# Patient Record
Sex: MALE | Race: WHITE | NOT HISPANIC OR LATINO | URBAN - METROPOLITAN AREA
[De-identification: names, ages, dates, MRNs, and addresses within clinical notes are randomized per-mention and may not be internally consistent; named-entity substitution may affect disease eponyms.]

---

## 2017-10-19 ENCOUNTER — EMERGENCY (EMERGENCY)
Facility: HOSPITAL | Age: 37
LOS: 1 days | Discharge: HOME | End: 2017-10-19
Attending: EMERGENCY MEDICINE | Admitting: EMERGENCY MEDICINE
Payer: MEDICAID

## 2017-10-19 VITALS
TEMPERATURE: 98 F | RESPIRATION RATE: 16 BRPM | OXYGEN SATURATION: 100 % | SYSTOLIC BLOOD PRESSURE: 113 MMHG | DIASTOLIC BLOOD PRESSURE: 70 MMHG | HEART RATE: 63 BPM

## 2017-10-19 LAB
ALBUMIN SERPL ELPH-MCNC: 4.8 G/DL — SIGNIFICANT CHANGE UP (ref 3.3–5)
ALP SERPL-CCNC: 55 U/L — SIGNIFICANT CHANGE UP (ref 40–120)
ALT FLD-CCNC: 31 U/L — SIGNIFICANT CHANGE UP (ref 4–41)
AST SERPL-CCNC: 20 U/L — SIGNIFICANT CHANGE UP (ref 4–40)
BASOPHILS # BLD AUTO: 0.02 K/UL — SIGNIFICANT CHANGE UP (ref 0–0.2)
BASOPHILS NFR BLD AUTO: 0.2 % — SIGNIFICANT CHANGE UP (ref 0–2)
BILIRUB SERPL-MCNC: 0.4 MG/DL — SIGNIFICANT CHANGE UP (ref 0.2–1.2)
BUN SERPL-MCNC: 18 MG/DL — SIGNIFICANT CHANGE UP (ref 7–23)
CALCIUM SERPL-MCNC: 9.4 MG/DL — SIGNIFICANT CHANGE UP (ref 8.4–10.5)
CHLORIDE SERPL-SCNC: 101 MMOL/L — SIGNIFICANT CHANGE UP (ref 98–107)
CO2 SERPL-SCNC: 23 MMOL/L — SIGNIFICANT CHANGE UP (ref 22–31)
CREAT SERPL-MCNC: 1.06 MG/DL — SIGNIFICANT CHANGE UP (ref 0.5–1.3)
EOSINOPHIL # BLD AUTO: 0.11 K/UL — SIGNIFICANT CHANGE UP (ref 0–0.5)
EOSINOPHIL NFR BLD AUTO: 0.9 % — SIGNIFICANT CHANGE UP (ref 0–6)
ERYTHROCYTE [SEDIMENTATION RATE] IN BLOOD: 2 MM/HR — SIGNIFICANT CHANGE UP (ref 1–15)
GLUCOSE SERPL-MCNC: 91 MG/DL — SIGNIFICANT CHANGE UP (ref 70–99)
HCT VFR BLD CALC: 46.2 % — SIGNIFICANT CHANGE UP (ref 39–50)
HGB BLD-MCNC: 15.6 G/DL — SIGNIFICANT CHANGE UP (ref 13–17)
IMM GRANULOCYTES # BLD AUTO: 0.06 # — SIGNIFICANT CHANGE UP
IMM GRANULOCYTES NFR BLD AUTO: 0.5 % — SIGNIFICANT CHANGE UP (ref 0–1.5)
LYMPHOCYTES # BLD AUTO: 2.95 K/UL — SIGNIFICANT CHANGE UP (ref 1–3.3)
LYMPHOCYTES # BLD AUTO: 24.3 % — SIGNIFICANT CHANGE UP (ref 13–44)
MCHC RBC-ENTMCNC: 31 PG — SIGNIFICANT CHANGE UP (ref 27–34)
MCHC RBC-ENTMCNC: 33.8 % — SIGNIFICANT CHANGE UP (ref 32–36)
MCV RBC AUTO: 91.7 FL — SIGNIFICANT CHANGE UP (ref 80–100)
MONOCYTES # BLD AUTO: 0.8 K/UL — SIGNIFICANT CHANGE UP (ref 0–0.9)
MONOCYTES NFR BLD AUTO: 6.6 % — SIGNIFICANT CHANGE UP (ref 2–14)
NEUTROPHILS # BLD AUTO: 8.18 K/UL — HIGH (ref 1.8–7.4)
NEUTROPHILS NFR BLD AUTO: 67.5 % — SIGNIFICANT CHANGE UP (ref 43–77)
NRBC # FLD: 0 — SIGNIFICANT CHANGE UP
PLATELET # BLD AUTO: 133 K/UL — LOW (ref 150–400)
PMV BLD: 13.5 FL — HIGH (ref 7–13)
POTASSIUM SERPL-MCNC: 4.3 MMOL/L — SIGNIFICANT CHANGE UP (ref 3.5–5.3)
POTASSIUM SERPL-SCNC: 4.3 MMOL/L — SIGNIFICANT CHANGE UP (ref 3.5–5.3)
PROT SERPL-MCNC: 7.8 G/DL — SIGNIFICANT CHANGE UP (ref 6–8.3)
RBC # BLD: 5.04 M/UL — SIGNIFICANT CHANGE UP (ref 4.2–5.8)
RBC # FLD: 12.2 % — SIGNIFICANT CHANGE UP (ref 10.3–14.5)
SODIUM SERPL-SCNC: 139 MMOL/L — SIGNIFICANT CHANGE UP (ref 135–145)
WBC # BLD: 12.12 K/UL — HIGH (ref 3.8–10.5)
WBC # FLD AUTO: 12.12 K/UL — HIGH (ref 3.8–10.5)

## 2017-10-19 PROCEDURE — 73701 CT LOWER EXTREMITY W/DYE: CPT | Mod: 26,RT

## 2017-10-19 PROCEDURE — 99285 EMERGENCY DEPT VISIT HI MDM: CPT

## 2017-10-19 RX ORDER — TRAMADOL HYDROCHLORIDE 50 MG/1
1 TABLET ORAL
Qty: 12 | Refills: 0
Start: 2017-10-19 | End: 2017-10-22

## 2017-10-19 RX ORDER — OXYCODONE AND ACETAMINOPHEN 5; 325 MG/1; MG/1
1 TABLET ORAL ONCE
Qty: 0 | Refills: 0 | Status: DISCONTINUED | OUTPATIENT
Start: 2017-10-19 | End: 2017-10-19

## 2017-10-19 RX ADMIN — OXYCODONE AND ACETAMINOPHEN 1 TABLET(S): 5; 325 TABLET ORAL at 15:08

## 2017-10-19 RX ADMIN — Medication 300 MILLIGRAM(S): at 18:24

## 2017-10-19 RX ADMIN — OXYCODONE AND ACETAMINOPHEN 1 TABLET(S): 5; 325 TABLET ORAL at 15:55

## 2017-10-19 NOTE — ED PROVIDER NOTE - PROGRESS NOTE DETAILS
BEATRIZ Conn - pt states his pain was not controlled at home with ibuprofen only. percocet helped here. will send prescription. marked area of cellulitis, mild erythema +warmth. no swelling. no fever, chills, significant elevated wbc. pt ambulating well. amenable for dc, given return precautions for cellulitis. BEATRIZ Conn - pt states his pain was not controlled at home with ibuprofen only. percocet helped here. pt states he is on probation for unknown cause and may have to complete drug test and needs validation, as per . Amanuel Mackey will send prescription for Tramadol. marked area of cellulitis, mild erythema +warmth. no swelling. no fever, chills, no significant elevated wbc. pt ambulating well. amenable for dc, given return precautions for cellulitis.

## 2017-10-19 NOTE — ED PROVIDER NOTE - PLAN OF CARE
You received antibiotics Clindamycin and Percocet for pain today on 10/19/17. Rest, advance activity as tolerated. Keep foot elevated. Take Clindamycin 300mg every 6 hours for 5 days for infection. Take Ibuprofen 600mg (three over the counter pills) every 8 hours as needed for moderate pain. Take Percocet every 6 hours as needed for severe pain - DO NOT DRIVE, DRINK ALCOHOL OR HANDLE HEAVY MACHINERY as this may cause drowsiness. Follow up with Podiatry this week, referral list given. Return to ER for any new or worsening symptoms, fever, chills, increased redness, swelling, streaking, weakness, numbness, or any other concerns. You received antibiotics Clindamycin and Percocet for pain today on 10/19/17. Rest, advance activity as tolerated. Keep foot elevated. Take Clindamycin 300mg every 6 hours for 5 days for infection. Take Ibuprofen 600mg (three over the counter pills) every 8 hours as needed for moderate pain. Take Tramadol every 6 hours as needed for severe pain - DO NOT DRIVE, DRINK ALCOHOL OR HANDLE HEAVY MACHINERY as this may cause drowsiness. Follow up with Podiatry this week, referral list given. Return to ER for any new or worsening symptoms, fever, chills, increased redness, swelling, streaking, weakness, numbness, or any other concerns.

## 2017-10-19 NOTE — ED PROVIDER NOTE - OBJECTIVE STATEMENT
38 y/o M w/ no significant PMHx, presents to the ED c/o right foot pain x2 days. Pt was seen at urgent care yesterday, negative XR and Rx Ibuprofen w/ no relief. Denies trauma, fall, numbness/tingling or any other complaints. NKDA.

## 2017-10-19 NOTE — ED PROVIDER NOTE - MEDICAL DECISION MAKING DETAILS
36 y/o M w/ right foot pain x1 day, negative XR as outpatient w/ evidence of inflammation and signs of infection. Will obtain XR of foot, blood test and pain control.

## 2017-10-19 NOTE — ED PROVIDER NOTE - CARE PLAN
Principal Discharge DX:	Cellulitis  Instructions for follow-up, activity and diet:	You received antibiotics Clindamycin and Percocet for pain today on 10/19/17. Rest, advance activity as tolerated. Keep foot elevated. Take Clindamycin 300mg every 6 hours for 5 days for infection. Take Ibuprofen 600mg (three over the counter pills) every 8 hours as needed for moderate pain. Take Percocet every 6 hours as needed for severe pain - DO NOT DRIVE, DRINK ALCOHOL OR HANDLE HEAVY MACHINERY as this may cause drowsiness. Follow up with Podiatry this week, referral list given. Return to ER for any new or worsening symptoms, fever, chills, increased redness, swelling, streaking, weakness, numbness, or any other concerns. Principal Discharge DX:	Cellulitis  Instructions for follow-up, activity and diet:	You received antibiotics Clindamycin and Percocet for pain today on 10/19/17. Rest, advance activity as tolerated. Keep foot elevated. Take Clindamycin 300mg every 6 hours for 5 days for infection. Take Ibuprofen 600mg (three over the counter pills) every 8 hours as needed for moderate pain. Take Tramadol every 6 hours as needed for severe pain - DO NOT DRIVE, DRINK ALCOHOL OR HANDLE HEAVY MACHINERY as this may cause drowsiness. Follow up with Podiatry this week, referral list given. Return to ER for any new or worsening symptoms, fever, chills, increased redness, swelling, streaking, weakness, numbness, or any other concerns.

## 2017-10-19 NOTE — ED PROVIDER NOTE - LOWER EXTREMITY EXAM, RIGHT
approximately half of palm area of redness overlying the metatarsal of 4th and 5th toes w/ ttp, neurovascularly intact

## 2017-10-19 NOTE — ED ADULT TRIAGE NOTE - CHIEF COMPLAINT QUOTE
pt c/o non traumatic pain to right foot, states that he was seen in Urgent Care yesterday and had neg xray and was given Motrin, not feeling better.  Pt in NAD, ambulatory with limp

## 2017-10-21 ENCOUNTER — EMERGENCY (EMERGENCY)
Facility: HOSPITAL | Age: 37
LOS: 1 days | Discharge: HOME | End: 2017-10-21
Attending: EMERGENCY MEDICINE | Admitting: EMERGENCY MEDICINE
Payer: MEDICAID

## 2017-10-21 VITALS
HEART RATE: 66 BPM | SYSTOLIC BLOOD PRESSURE: 111 MMHG | RESPIRATION RATE: 18 BRPM | DIASTOLIC BLOOD PRESSURE: 72 MMHG | TEMPERATURE: 98 F | OXYGEN SATURATION: 100 %

## 2017-10-21 LAB
ALBUMIN SERPL ELPH-MCNC: 4.3 G/DL — SIGNIFICANT CHANGE UP (ref 3.3–5)
ALP SERPL-CCNC: 51 U/L — SIGNIFICANT CHANGE UP (ref 40–120)
ALT FLD-CCNC: 33 U/L — SIGNIFICANT CHANGE UP (ref 4–41)
AST SERPL-CCNC: 19 U/L — SIGNIFICANT CHANGE UP (ref 4–40)
BASOPHILS # BLD AUTO: 0.02 K/UL — SIGNIFICANT CHANGE UP (ref 0–0.2)
BASOPHILS NFR BLD AUTO: 0.2 % — SIGNIFICANT CHANGE UP (ref 0–2)
BILIRUB SERPL-MCNC: 0.2 MG/DL — SIGNIFICANT CHANGE UP (ref 0.2–1.2)
BUN SERPL-MCNC: 15 MG/DL — SIGNIFICANT CHANGE UP (ref 7–23)
CALCIUM SERPL-MCNC: 9 MG/DL — SIGNIFICANT CHANGE UP (ref 8.4–10.5)
CHLORIDE SERPL-SCNC: 106 MMOL/L — SIGNIFICANT CHANGE UP (ref 98–107)
CK MB BLD-MCNC: 1.45 NG/ML — SIGNIFICANT CHANGE UP (ref 1–6.6)
CK MB BLD-MCNC: SIGNIFICANT CHANGE UP (ref 0–2.5)
CK SERPL-CCNC: 78 U/L — SIGNIFICANT CHANGE UP (ref 30–200)
CO2 SERPL-SCNC: 22 MMOL/L — SIGNIFICANT CHANGE UP (ref 22–31)
CREAT SERPL-MCNC: 1.06 MG/DL — SIGNIFICANT CHANGE UP (ref 0.5–1.3)
EOSINOPHIL # BLD AUTO: 0.21 K/UL — SIGNIFICANT CHANGE UP (ref 0–0.5)
EOSINOPHIL NFR BLD AUTO: 2.2 % — SIGNIFICANT CHANGE UP (ref 0–6)
GLUCOSE SERPL-MCNC: 107 MG/DL — HIGH (ref 70–99)
HBA1C BLD-MCNC: 4.8 % — SIGNIFICANT CHANGE UP (ref 4–5.6)
HCT VFR BLD CALC: 43.8 % — SIGNIFICANT CHANGE UP (ref 39–50)
HGB BLD-MCNC: 14.7 G/DL — SIGNIFICANT CHANGE UP (ref 13–17)
IMM GRANULOCYTES # BLD AUTO: 0.03 # — SIGNIFICANT CHANGE UP
IMM GRANULOCYTES NFR BLD AUTO: 0.3 % — SIGNIFICANT CHANGE UP (ref 0–1.5)
LYMPHOCYTES # BLD AUTO: 3.44 K/UL — HIGH (ref 1–3.3)
LYMPHOCYTES # BLD AUTO: 36.8 % — SIGNIFICANT CHANGE UP (ref 13–44)
MCHC RBC-ENTMCNC: 31.7 PG — SIGNIFICANT CHANGE UP (ref 27–34)
MCHC RBC-ENTMCNC: 33.6 % — SIGNIFICANT CHANGE UP (ref 32–36)
MCV RBC AUTO: 94.6 FL — SIGNIFICANT CHANGE UP (ref 80–100)
MONOCYTES # BLD AUTO: 0.68 K/UL — SIGNIFICANT CHANGE UP (ref 0–0.9)
MONOCYTES NFR BLD AUTO: 7.3 % — SIGNIFICANT CHANGE UP (ref 2–14)
NEUTROPHILS # BLD AUTO: 4.96 K/UL — SIGNIFICANT CHANGE UP (ref 1.8–7.4)
NEUTROPHILS NFR BLD AUTO: 53.2 % — SIGNIFICANT CHANGE UP (ref 43–77)
NRBC # FLD: 0 — SIGNIFICANT CHANGE UP
PLATELET # BLD AUTO: 125 K/UL — LOW (ref 150–400)
PMV BLD: 13.9 FL — HIGH (ref 7–13)
POTASSIUM SERPL-MCNC: 4.2 MMOL/L — SIGNIFICANT CHANGE UP (ref 3.5–5.3)
POTASSIUM SERPL-SCNC: 4.2 MMOL/L — SIGNIFICANT CHANGE UP (ref 3.5–5.3)
PROT SERPL-MCNC: 7 G/DL — SIGNIFICANT CHANGE UP (ref 6–8.3)
RBC # BLD: 4.63 M/UL — SIGNIFICANT CHANGE UP (ref 4.2–5.8)
RBC # FLD: 12.3 % — SIGNIFICANT CHANGE UP (ref 10.3–14.5)
SODIUM SERPL-SCNC: 144 MMOL/L — SIGNIFICANT CHANGE UP (ref 135–145)
TROPONIN T SERPL-MCNC: < 0.06 NG/ML — SIGNIFICANT CHANGE UP (ref 0–0.06)
WBC # BLD: 9.34 K/UL — SIGNIFICANT CHANGE UP (ref 3.8–10.5)
WBC # FLD AUTO: 9.34 K/UL — SIGNIFICANT CHANGE UP (ref 3.8–10.5)

## 2017-10-21 PROCEDURE — 73630 X-RAY EXAM OF FOOT: CPT | Mod: 26,RT

## 2017-10-21 PROCEDURE — 99220: CPT

## 2017-10-21 RX ORDER — CEFAZOLIN SODIUM 1 G
1000 VIAL (EA) INJECTION ONCE
Qty: 0 | Refills: 0 | Status: COMPLETED | OUTPATIENT
Start: 2017-10-21 | End: 2017-10-21

## 2017-10-21 RX ORDER — IBUPROFEN 200 MG
600 TABLET ORAL ONCE
Qty: 0 | Refills: 0 | Status: COMPLETED | OUTPATIENT
Start: 2017-10-21 | End: 2017-10-21

## 2017-10-21 RX ORDER — OXYCODONE AND ACETAMINOPHEN 5; 325 MG/1; MG/1
1 TABLET ORAL ONCE
Qty: 0 | Refills: 0 | Status: DISCONTINUED | OUTPATIENT
Start: 2017-10-21 | End: 2017-10-21

## 2017-10-21 RX ORDER — CEFAZOLIN SODIUM 1 G
1000 VIAL (EA) INJECTION EVERY 8 HOURS
Qty: 0 | Refills: 0 | Status: DISCONTINUED | OUTPATIENT
Start: 2017-10-22 | End: 2017-10-25

## 2017-10-21 RX ORDER — CEFAZOLIN SODIUM 1 G
VIAL (EA) INJECTION
Qty: 0 | Refills: 0 | Status: DISCONTINUED | OUTPATIENT
Start: 2017-10-21 | End: 2017-10-25

## 2017-10-21 RX ORDER — ASPIRIN/CALCIUM CARB/MAGNESIUM 324 MG
325 TABLET ORAL ONCE
Qty: 0 | Refills: 0 | Status: COMPLETED | OUTPATIENT
Start: 2017-10-21 | End: 2017-10-21

## 2017-10-21 RX ORDER — AMPICILLIN SODIUM AND SULBACTAM SODIUM 250; 125 MG/ML; MG/ML
INJECTION, POWDER, FOR SUSPENSION INTRAMUSCULAR; INTRAVENOUS
Qty: 0 | Refills: 0 | Status: DISCONTINUED | OUTPATIENT
Start: 2017-10-21 | End: 2017-10-21

## 2017-10-21 RX ORDER — AMPICILLIN SODIUM AND SULBACTAM SODIUM 250; 125 MG/ML; MG/ML
3 INJECTION, POWDER, FOR SUSPENSION INTRAMUSCULAR; INTRAVENOUS ONCE
Qty: 0 | Refills: 0 | Status: DISCONTINUED | OUTPATIENT
Start: 2017-10-21 | End: 2017-10-21

## 2017-10-21 RX ADMIN — Medication 325 MILLIGRAM(S): at 23:11

## 2017-10-21 RX ADMIN — Medication 100 MILLIGRAM(S): at 23:09

## 2017-10-21 RX ADMIN — OXYCODONE AND ACETAMINOPHEN 1 TABLET(S): 5; 325 TABLET ORAL at 22:40

## 2017-10-21 RX ADMIN — Medication 600 MILLIGRAM(S): at 22:52

## 2017-10-21 RX ADMIN — Medication 600 MILLIGRAM(S): at 23:05

## 2017-10-21 RX ADMIN — OXYCODONE AND ACETAMINOPHEN 1 TABLET(S): 5; 325 TABLET ORAL at 21:14

## 2017-10-21 NOTE — ED ADULT NURSE NOTE - CHIEF COMPLAINT QUOTE
Pt was seen in ED x 2 days ago for cellulitis to right foot. Prescribed antibiotics. States swelling, redness and pain has become worse. Co pain when bearing weight on foot.

## 2017-10-21 NOTE — ED PROVIDER NOTE - PHYSICAL EXAMINATION
Right foot: dorsal aspect of foot: positive worsening erythema extending passed demarcated line, positive ROM, positive 2+ distal pulses, less than 2 sec, neurovascular intact, pt ambulating.

## 2017-10-21 NOTE — ED PROVIDER NOTE - PROGRESS NOTE DETAILS
Pt failing po abx. Pt to go to cdu for iv abx. Repeat xray ordered. abx switched to ancef. Podiatry called to see pt in CDU. CDU BEATRIZ Farfan agrees with plan. Pt agrees with plan. Pt still in RW, and c/o left sided chest " pinching". No hx of chest pain or cardiac hx. EKG done. Showing early replorization, no prior EKG to compare. trop/ ck added to labs, asa given. Pt to be on tele monitor and repeat ce x2, consider stress if still having symptoms. Discussed plan with cdu PA. asked to see patient by BEATRIZ Ang due to complexity of care. pt 37M with recent diagnosis of R foot cellulitis, d/c'd from [facility] ED 2 days ago on clindamycin, returns to ED with worsening R foot redness, pain. without fever/chills, asked to see patient by BEATRIZ Ang due to complexity of care. pt 37M with recent diagnosis of R foot cellulitis, d/c'd from [facility] ED 2 days ago on clindamycin, returns to ED with worsening R foot redness, pain. without fever/chills, n/v/d. while in ED, pt developed a L-sided chest "pinching," moderate, no provoking or alleviating factors. no sob. no hx cardiac disease.    PE: NAD, NCAT, MMM, Trachea midline, Normal conjunctiva, lungs CTAB, S1/S2 RRR, Normal perfusion, Abdomen Soft, NTND, No rebound/guarding. RLE with erythema to lateral aspect of dorsum of R foot, with minimal warmth, +TTP. full ROM R ankle. without joint ttp. 2+ DP pulse.    37M with R foot cellulitis diagnosed two days ago, on clindamycin, with worsening redness and pain. concern for worsening cellulitis not responding to clindamycin. abx changed to ancef. low suspicion of osteomyelitis, low suspicion of septic joint. plan for cdu, iv abx, evaluation by podiatry. pt also developed chest "pinching" in ED. EKG c/w early repolarization, as "[place]" pattern seen V3, V4, <2 mm elevation of V2, V3, although does have T wave inversion inferior leads, no old EKG to compare to. no hx cardiac disease. low suspicion of acs. will give asa, plan for delta trop and stress in light of ekg findings and new onset chest pain. - Supa Li MD

## 2017-10-21 NOTE — ED ADULT TRIAGE NOTE - CHIEF COMPLAINT QUOTE
Pt was seen in ED x 2 days ago for cellulitis to right foot. Prescribed antibiotics. States swelling, redness and pain has become worse. Pt was seen in ED x 2 days ago for cellulitis to right foot. Prescribed antibiotics. States swelling, redness and pain has become worse. Co pain when bearing weight on foot.

## 2017-10-21 NOTE — ED PROVIDER NOTE - OBJECTIVE STATEMENT
37 yr old presents c/o right foot pain/ increase redness. Pt was seen on ED on 10/ 19, ct showing cellulitis, and was sent home on clinda po. Pt presents today, with worsening pain and redness extended past demarcated line. Pt took clinda PO for one day. Pt denies any new trauma or injury or any other symptoms. Denies any fever or chills. 37 yr old presents c/o right foot pain/ increase redness. Pt was seen on ED on 10/ 19, ct showing cellulitis, and was sent home on clinda po. Pt presents today, with worsening pain and redness extended past demarcated line. Pt took clinda po for the last 3 days. Pt denies any new trauma or injury or any other symptoms. Denies any fever or chills.

## 2017-10-21 NOTE — ED ADULT NURSE NOTE - OBJECTIVE STATEMENT
Received on stretcher in intake 8. Awake, A&Ox3, ambulatory with cane (without assistance at baseline), NAD observed, respirations even and unlabored on room air. 36 YO male no significant PMH c/o right foot pain. Pain 10/10 at present, located on right foot, non-radiating, onset 4 days ago, worsening, reddish, warm to touch, no precipitating/relieving factors identified, no associated symptoms. Denies fevers, other symptoms. 20G IV access obtained in right AC, labs drawn and sent as ordered. Comfort and safety measures provided. Call bell within reach.

## 2017-10-21 NOTE — ED PROVIDER NOTE - MEDICAL DECISION MAKING DETAILS
37 yr old presents c/o right foot pain/ increase redness. Pt was seen on ED on 10/ 19, ct showing cellulitis, and was sent home on clinda po. Pt presents today, with worsening pain and redness extended past demarcated line. Pt took clinda PO for one day: 37 yr old presents c/o right foot pain/ increase redness. Pt was seen on ED on 10/ 19, ct showing cellulitis, and was sent home on clinda po. Pt presents today, with worsening pain and redness extended past demarcated line. Pt took clinda PO for the last three days: failing po abx, cbc, cmp, cdu for iv abx, podiatry to see pt, repeat xray

## 2017-10-22 VITALS
HEART RATE: 88 BPM | TEMPERATURE: 98 F | OXYGEN SATURATION: 99 % | DIASTOLIC BLOOD PRESSURE: 56 MMHG | RESPIRATION RATE: 18 BRPM | SYSTOLIC BLOOD PRESSURE: 107 MMHG

## 2017-10-22 LAB
CK MB BLD-MCNC: 1.27 NG/ML — SIGNIFICANT CHANGE UP (ref 1–6.6)
CK MB BLD-MCNC: SIGNIFICANT CHANGE UP (ref 0–2.5)
CK SERPL-CCNC: 65 U/L — SIGNIFICANT CHANGE UP (ref 30–200)
TROPONIN T SERPL-MCNC: < 0.06 NG/ML — SIGNIFICANT CHANGE UP (ref 0–0.06)
URATE SERPL-MCNC: 7.2 MG/DL — SIGNIFICANT CHANGE UP (ref 3.4–8.8)

## 2017-10-22 PROCEDURE — 99217: CPT

## 2017-10-22 PROCEDURE — 71020: CPT | Mod: 26

## 2017-10-22 PROCEDURE — 93018 CV STRESS TEST I&R ONLY: CPT | Mod: GC

## 2017-10-22 PROCEDURE — 78452 HT MUSCLE IMAGE SPECT MULT: CPT | Mod: 26

## 2017-10-22 PROCEDURE — 93016 CV STRESS TEST SUPVJ ONLY: CPT | Mod: GC

## 2017-10-22 RX ORDER — ACETAMINOPHEN 500 MG
1000 TABLET ORAL ONCE
Qty: 0 | Refills: 0 | Status: COMPLETED | OUTPATIENT
Start: 2017-10-22 | End: 2017-10-22

## 2017-10-22 RX ORDER — CEPHALEXIN 500 MG
1 CAPSULE ORAL
Qty: 21 | Refills: 0
Start: 2017-10-22 | End: 2017-10-29

## 2017-10-22 RX ORDER — COLCHICINE 0.6 MG
0.6 TABLET ORAL ONCE
Qty: 0 | Refills: 0 | Status: COMPLETED | OUTPATIENT
Start: 2017-10-22 | End: 2017-10-22

## 2017-10-22 RX ORDER — INDOMETHACIN 50 MG
1 CAPSULE ORAL
Qty: 15 | Refills: 0
Start: 2017-10-22

## 2017-10-22 RX ADMIN — Medication 1000 MILLIGRAM(S): at 01:49

## 2017-10-22 RX ADMIN — Medication 100 MILLIGRAM(S): at 06:50

## 2017-10-22 RX ADMIN — Medication 400 MILLIGRAM(S): at 01:19

## 2017-10-22 RX ADMIN — Medication 0.6 MILLIGRAM(S): at 01:40

## 2017-10-22 NOTE — ED CDU PROVIDER INITIAL DAY NOTE - ATTENDING CONTRIBUTION TO CARE
37M with pmh of GERD with worsening R foot pain, redness, with recent diagnosis of cellulitis supported by CT findings. Treated as outpatient with clinda with worsening of symptoms. Without fever/chills. With exam showing erythema/warmth/ttp of R foot concerning for worsening of cellulitis. With low suspicion of osteomyelitis, able to range ankle, with low suspicion of joint infection. Pt also with episode of chest "pinching" in ED, with abnormalities on EKG but most supportive of early repol. Pt admitted to CDU for IV abx, overnight monitoring, and evaluation by podiatry. In setting of chest pain and abnormal ekg to perform delta troponin, give ASA, stress test in AM. - Supa Li MD 37M with pmh of GERD with worsening R foot pain, redness, with recent diagnosis of cellulitis supported by CT findings. Treated as outpatient with clinda with worsening of symptoms. Without fever/chills. With exam showing erythema/warmth/ttp of R foot concerning for worsening of cellulitis. With low suspicion of osteomyelitis, able to range ankle, with low suspicion of joint infection. Pt also with episode of chest "pinching" in ED, with abnormalities on EKG showing early repol and non-specific ST changes. Pt admitted to CDU for IV abx, overnight monitoring, and evaluation by podiatry. In setting of chest pain and abnormal ekg to perform delta troponin, give ASA, stress test in AM. - Supa Li MD

## 2017-10-22 NOTE — ED CDU PROVIDER DISPOSITION NOTE - ATTENDING CONTRIBUTION TO CARE
CDU MD LAZO:  I performed a face to face bedside interview with patient regarding history of present illness, review of symptoms and past medical history. I completed an independent physical exam.  I have discussed patient's plan of care with PA.   I agree with note as stated above, having amended the EMR as needed to reflect my findings. I have discussed the assessment and plan of care.  This includes during the time I functioned as the attending physician for this patient.  CDU MD CHO - Attending Discharge Note: Patient is stable.  Labs and tests reviewed with the patient.  The patient is stable for discharge.

## 2017-10-22 NOTE — ED CDU PROVIDER DISPOSITION NOTE - CLINICAL COURSE
38 y/o male with right foot redness and pain with cp.  Sent to cdu for acs r/o and podiatry eval/iv abx.  Rash improved with colchicine and iv abx.  Per podiatry, unclear if gout vs cellulitis.  Stress neg and no trops or ekg changes during cdu stay.  Will discharge home with nsaids, abx, pcp / cards f/u.

## 2017-10-22 NOTE — ED CDU PROVIDER SUBSEQUENT DAY NOTE - SKIN RASH DESCRIPTION
erythema over dorsum of right foot receding from border drawn yesterday, mild ttp, no crepitus or bullae/REDDENED

## 2017-10-22 NOTE — ED CDU PROVIDER INITIAL DAY NOTE - MEDICAL DECISION MAKING DETAILS
Pt is 36 yo male with PMHx of GERD presents to the ED c/o of worsening right foot pain and redness x 4 days. Likely cellulitis with failed outpt abx. Pt had solid line drawn on him previously and redness extends past the line. New dotted line has been drawn on pt. In ED pt started to complain of a pinching left sided chest pain, non radiating. EKG was done showing early repol and non specific ST and T wave abnormalities. Pt received asa, with some improvement of pain. Pt sent to CDU for IV abx, podiatry following, Tele, Tam x2, ekg, stress in am.

## 2017-10-22 NOTE — ED CDU PROVIDER SUBSEQUENT DAY NOTE - ATTENDING CONTRIBUTION TO CARE
CDU MD LAZO:  I performed a face to face bedside interview with patient regarding history of present illness, review of symptoms and past medical history. I completed an independent physical exam.  I have discussed patient's plan of care with PA.   I agree with note as stated above, having amended the EMR as needed to reflect my findings. I have discussed the assessment and plan of care.  This includes during the time I functioned as the attending physician for this patient.    HPI / ROS / PE / MDM written by myself.

## 2017-10-22 NOTE — ED CDU PROVIDER SUBSEQUENT DAY NOTE - PROGRESS NOTE DETAILS
Pt complaining of chest pain: repeat EKG unchanged from prior, CEsx2 sent. Podiatry saw pt evaluated the foot. Likely more gout than cellulitis. Recommend colchicine, uric acid, serum sent off. Pt to be reevaled in am but podiatry. Pt awaiting stress and podiatry in am. BEATRIZ Bhatti: pt feels well ambulating without difficulty.  Pt seen and evaluated by podiatry.  Stress test negative.  Discharge and results reviewed and discussed with patient. MD CHO:  Pt reports feeling better.  CP free.  Rash receding from demarcated area on right foot, mild ttp without crepitus or bullae or discharge.  Pending stress.

## 2017-10-22 NOTE — CONSULT NOTE ADULT - ASSESSMENT
36 yo M w/ right midfoot foot gout flare  - Pt seen and examined  - Pt treated for gout and cellulitis, this was likely an acute gout flare  - WBC and ESR do not suggest an infectious process, and no breaks in the skin  - Pain was relieved w/ colchicine   - Pt podiatrically stable for d/c  - Rec Rx for indocin to have at home for any future acute flares  - Rec pt f/u with PMD for management of gout and future treatment  - Pt can f/u as necessary with Dr. Sanchez for any other foot related issues call [phone] to schedule an appt.

## 2017-10-22 NOTE — ED CDU PROVIDER INITIAL DAY NOTE - PHYSICAL EXAMINATION
Right foot: dorsal aspect of foot: positive worsening erythema extending passed demarcated line, positive ROM, positive 2+ distal pulses, less than 2 sec, neurovascular intact, pt ambulating. RLE: foot dorsal aspect of foot with worsening erythema extending passed demarcated line, new line dotted draw to show new redness. FROM, NVI, pt ambulating.

## 2017-10-22 NOTE — ED CDU PROVIDER INITIAL DAY NOTE - OBJECTIVE STATEMENT
37 yr old presents c/o right foot pain/ increase redness. Pt was seen on ED on 10/ 19, ct showing cellulitis, and was sent home on clinda po. Pt presents today, with worsening pain and redness extended past demarcated line. Pt took clinda po for the last 3 days. Pt denies any new trauma or injury or any other symptoms. Denies any fever or chills. 37 yr old presents c/o right foot pain/ increase redness. Pt was seen on ED on 10/ 19, ct showing cellulitis, and was sent home on clinda po. Pt presents today, with worsening pain and redness extended past demarcated line. Pt took clinda po for the last 3 days. Pt denies any new trauma or injury or any other symptoms. Denies any fever or chills.    CDU BEATRIZ Farfan: Agree with above note. Pt is 38 yo male with PMHx of GERD presents to the ED c/o of worsening right foot pain and redness x 4 days. Pt was seen here in the ED on 10/19 was given PO clinda taken x 3 days, with no improvement and with worsening of symptoms. Pt had solid line drawn on him previously and redness extends past the line. New dotted line has been drawn on pt. In ED pt started to complain of a pinching left sided chest pain, non radiating. EKG was done showing early repol and non specific ST and T wave abnormalities. Pt received asa, with some improvement of pain. Pt sent to CDU for IV abx, podiatry following, Tele, Tma x2, ekg, stress in am.

## 2017-10-22 NOTE — CONSULT NOTE ADULT - SUBJECTIVE AND OBJECTIVE BOX
Patient is a 37y old  Male who presents with a chief complaint of right foot redness, swelling, and pain.     HPI: 37 yr old presents c/o right foot pain/ increase redness. Pt was seen on ED on 10/ 19, ct showing cellulitis, and was sent home on clinda po. Pt presents today, with worsening pain and redness extended past demarcated line. Pt took clinda po for the last 3 days. Pt denies any new trauma or injury or any other symptoms. Denies any fever or chills.      PAST MEDICAL & SURGICAL HISTORY:  GERD (gastroesophageal reflux disease)  No significant past surgical history      MEDICATIONS  (STANDING):  ceFAZolin   IVPB 1000 milliGRAM(s) IV Intermittent every 8 hours  ceFAZolin   IVPB        MEDICATIONS  (PRN):      Allergies    No Known Allergies    Intolerances        VITALS:    Vital Signs Last 24 Hrs  T(C): 36.5 (22 Oct 2017 05:21), Max: 36.9 (21 Oct 2017 23:05)  T(F): 97.7 (22 Oct 2017 05:21), Max: 98.4 (21 Oct 2017 23:05)  HR: 54 (22 Oct 2017 06:26) (44 - 66)  BP: 98/52 (22 Oct 2017 05:21) (98/52 - 123/79)  BP(mean): --  RR: 14 (22 Oct 2017 05:21) (14 - 18)  SpO2: 99% (22 Oct 2017 05:21) (97% - 100%)    LABS:                          14.7   9.34  )-----------( 125      ( 21 Oct 2017 21:10 )             43.8       10-21    144  |  106  |  15  ----------------------------<  107<H>  4.2   |  22  |  1.06    Ca    9.0      21 Oct 2017 21:10    TPro  7.0  /  Alb  4.3  /  TBili  0.2  /  DBili  x   /  AST  19  /  ALT  33  /  AlkPhos  51  10-21          LOWER EXTREMITY PHYSICAL EXAM:    Vasular: DP/PT 2/4, B/L, CFT <3 seconds B/L, Temperature gradient warm b/l.  no change in temperature at location of previous erythema, right foot.   Neuro: Epicritic sensation intact b/l feet  Musculoskeletal/Ortho: No gross deformities  Skin: R foot erythema significantly decreased today, has receded from the outlined erythema from both 10/19 and 10/21. No openings in the skin noted.

## 2017-12-09 ENCOUNTER — EMERGENCY (EMERGENCY)
Facility: HOSPITAL | Age: 37
LOS: 1 days | Discharge: HOME | End: 2017-12-09
Attending: EMERGENCY MEDICINE | Admitting: EMERGENCY MEDICINE
Payer: MEDICAID

## 2017-12-09 VITALS
TEMPERATURE: 100 F | SYSTOLIC BLOOD PRESSURE: 135 MMHG | OXYGEN SATURATION: 100 % | DIASTOLIC BLOOD PRESSURE: 75 MMHG | RESPIRATION RATE: 24 BRPM | HEART RATE: 105 BPM

## 2017-12-09 VITALS
SYSTOLIC BLOOD PRESSURE: 128 MMHG | RESPIRATION RATE: 20 BRPM | TEMPERATURE: 99 F | OXYGEN SATURATION: 100 % | HEART RATE: 77 BPM | DIASTOLIC BLOOD PRESSURE: 78 MMHG

## 2017-12-09 PROCEDURE — 99284 EMERGENCY DEPT VISIT MOD MDM: CPT | Mod: 25

## 2017-12-09 RX ORDER — DEXAMETHASONE 0.5 MG/5ML
10 ELIXIR ORAL ONCE
Qty: 0 | Refills: 0 | Status: COMPLETED | OUTPATIENT
Start: 2017-12-09 | End: 2017-12-09

## 2017-12-09 RX ORDER — ACETAMINOPHEN 500 MG
650 TABLET ORAL ONCE
Qty: 0 | Refills: 0 | Status: COMPLETED | OUTPATIENT
Start: 2017-12-09 | End: 2017-12-09

## 2017-12-09 RX ORDER — DEXAMETHASONE 0.5 MG/5ML
10 ELIXIR ORAL ONCE
Qty: 0 | Refills: 0 | Status: DISCONTINUED | OUTPATIENT
Start: 2017-12-09 | End: 2017-12-09

## 2017-12-09 RX ADMIN — Medication 10 MILLIGRAM(S): at 04:09

## 2017-12-09 RX ADMIN — Medication 650 MILLIGRAM(S): at 04:08

## 2017-12-09 NOTE — ED PROVIDER NOTE - OBJECTIVE STATEMENT
Pt reports that his son was sick and treated at [facility] for an illness with sore throat requiring antibiotics. Subsequently, the patient developed a sore throat 3 days ago, he has also had some nasal drainage with the sore throat. It has caused him significant discomfort to the point that he has been taking Motrin 600, with his most recent dose being ~1 hr pta here in the ED. He has not tried taking tylenol yet for his pain. He has had fevers as high as 103 at home today. He has not had a cough.

## 2017-12-09 NOTE — ED PROVIDER NOTE - MEDICAL DECISION MAKING DETAILS
37yF 3 days of sore throat and fevers likely caught from his child at home. Centor score of 2 for fever (taken at home) and his report of minimal cough indicates no rapid strep test at this time. Non-febrile here 2/2 motrin pta. Reviewed pt's son's records and note that he was found to be Entero/rhino positive. Will tx with apap and dexamethasone for comfort. Reassess, dispo.

## 2017-12-09 NOTE — ED PROVIDER NOTE - NS ED ROS FT
General: + fevers and chills  HENT: No head trauma, mild ear pain, + runny nose, + sore throat  Eyes: No visual changes  CP: No chest pain, palpitations, or light headedness  Resp: No shortness of breath, no cough  GI: No abdominal pain, diarrhea, constipation, nausea, or vomiting  : No urinary fz, dysuria, or hematuria  Neuro: No numbness, tingling, or weakness  Endo: No hx of diabetes  Heme: No hx of easy bleeding or bruising

## 2017-12-09 NOTE — ED PROVIDER NOTE - PROGRESS NOTE DETAILS
ED visit of son checked for RVP, throat Cx results (with father's permission):  Jovanny Ritchie, [id number]

## 2017-12-09 NOTE — ED PROVIDER NOTE - PLAN OF CARE
Take acetaminophen (Tylenol) 975mg (3 tablets) up to 2 times per day as needed for pain. Never take more than 3000mg of acetaminophen/Tylenol in any 24 hour period. Alternate this with ibuprofen (Motrin) 600mg (3 tablets) up to 2 times per day as needed for pain with food or milk. This means take one medication, then 6 hours later take the other. Return here to the emergency department for any significant breathing difficulties, severe pain that is not improved with medications, or other new symptoms of concern.

## 2017-12-09 NOTE — ED PROVIDER NOTE - ATTENDING CONTRIBUTION TO CARE
MD Lebron:  patient seen and evaluated with the resident.  I was present for key portions of the History & Physical, and I agree with the Impression & Plan.  MD Lebron:  36 yo M, c/o 3d sore throat, fever; 1day cough; body aches.  Son treated in [facility] [place] on 12/2 for same Sx, and found to be RVP for entero/rhinovirus; GAS throat Cx negative; BCx was positive for strep viridans (though to be contaminant).  Currently doing well.  Better:  NSAIDs.  Worse: n/a.  Tm at home 103.  VS:  wnl.  Physical Exam: adult M, NAD, NCAT, PERRL, EOMI, Pharynx +erythema, no exudate, trace LAD, +dry cough.  Impression:  viral pharyngitis + cough.  Plan:  NSAIDs, dexamethasone for pharyngitis; return precautions.

## 2017-12-09 NOTE — ED PROVIDER NOTE - PHYSICAL EXAMINATION
Gen: NAD, non-toxic, conversational  Eyes: PERRL, EOMI   HENT: Normocephalic, atraumatic. External ears normal, no rhinorrhea, moist mucous membranes, posterior oropharynx with erythema, no exudates, no tonsillar erythema.   CV: RRR, no M/R/G  Resp: CTAB, non-labored, speaking without difficulty on room air  Abd: soft, non tender, non rigid, no guarding or rebound tenderness  Skin: dry, wwp   Neuro: AOx3, speech is fluent and appropriate  Psych: Mood good, affect euthymic

## 2017-12-09 NOTE — ED PROVIDER NOTE - CARE PLAN
Principal Discharge DX:	Viral pharyngitis  Secondary Diagnosis:	Viral URI with cough Principal Discharge DX:	Viral pharyngitis  Instructions for follow-up, activity and diet:	Take acetaminophen (Tylenol) 975mg (3 tablets) up to 2 times per day as needed for pain. Never take more than 3000mg of acetaminophen/Tylenol in any 24 hour period. Alternate this with ibuprofen (Motrin) 600mg (3 tablets) up to 2 times per day as needed for pain with food or milk. This means take one medication, then 6 hours later take the other. Return here to the emergency department for any significant breathing difficulties, severe pain that is not improved with medications, or other new symptoms of concern.  Secondary Diagnosis:	Viral URI with cough

## 2017-12-09 NOTE — ED ADULT TRIAGE NOTE - CHIEF COMPLAINT QUOTE
Pt c/o cough/fevers/L sided chest pain for the past two days.  Pt states chest pain constant without cough.  Denies any SOB.  Pt states fever Tmax 103F at home.  Last took ibuprofen approx 1 hour ago.  Denies any PMHx.

## 2017-12-09 NOTE — ED ADULT NURSE NOTE - OBJECTIVE STATEMENT
Received on stretcher in 5. Awake, A&Ox3, ambulatory without assistance, NAD observed, respirations even and unlabored on room air. 38 YO male no significant PMH c/o fever. Pt states he has had fevers, max at home 103 with chills, SOB, throat pain. Denies HA. Took Motrin before coming in. MD farley at bedside. Comfort and safety measures provided. Call bell within reach.

## 2020-11-17 ENCOUNTER — INPATIENT (INPATIENT)
Facility: HOSPITAL | Age: 40
LOS: 2 days | Discharge: HOME | DRG: 392 | End: 2020-11-20
Attending: INTERNAL MEDICINE | Admitting: INTERNAL MEDICINE
Payer: COMMERCIAL

## 2020-11-17 VITALS
SYSTOLIC BLOOD PRESSURE: 118 MMHG | RESPIRATION RATE: 16 BRPM | HEIGHT: 72 IN | OXYGEN SATURATION: 98 % | WEIGHT: 190.04 LBS | TEMPERATURE: 98 F | HEART RATE: 70 BPM | DIASTOLIC BLOOD PRESSURE: 80 MMHG

## 2020-11-17 DIAGNOSIS — Z90.49 ACQUIRED ABSENCE OF OTHER SPECIFIED PARTS OF DIGESTIVE TRACT: Chronic | ICD-10-CM

## 2020-11-17 DIAGNOSIS — I24.9 ACUTE ISCHEMIC HEART DISEASE, UNSPECIFIED: ICD-10-CM

## 2020-11-17 LAB
ACETONE SERPL-MCNC: NEGATIVE — SIGNIFICANT CHANGE UP
ALBUMIN SERPL ELPH-MCNC: 4.1 G/DL — SIGNIFICANT CHANGE UP (ref 3.5–5)
ALP SERPL-CCNC: 58 U/L — SIGNIFICANT CHANGE UP (ref 40–120)
ALT FLD-CCNC: 37 U/L DA — SIGNIFICANT CHANGE UP (ref 10–60)
ANION GAP SERPL CALC-SCNC: 6 MMOL/L — SIGNIFICANT CHANGE UP (ref 5–17)
AST SERPL-CCNC: 16 U/L — SIGNIFICANT CHANGE UP (ref 10–40)
BASOPHILS # BLD AUTO: 0.01 K/UL — SIGNIFICANT CHANGE UP (ref 0–0.2)
BASOPHILS NFR BLD AUTO: 0.1 % — SIGNIFICANT CHANGE UP (ref 0–2)
BILIRUB SERPL-MCNC: 0.7 MG/DL — SIGNIFICANT CHANGE UP (ref 0.2–1.2)
BUN SERPL-MCNC: 11 MG/DL — SIGNIFICANT CHANGE UP (ref 7–18)
CALCIUM SERPL-MCNC: 9.2 MG/DL — SIGNIFICANT CHANGE UP (ref 8.4–10.5)
CHLORIDE SERPL-SCNC: 108 MMOL/L — SIGNIFICANT CHANGE UP (ref 96–108)
CK SERPL-CCNC: 71 U/L — SIGNIFICANT CHANGE UP (ref 35–232)
CO2 SERPL-SCNC: 28 MMOL/L — SIGNIFICANT CHANGE UP (ref 22–31)
CREAT SERPL-MCNC: 0.96 MG/DL — SIGNIFICANT CHANGE UP (ref 0.5–1.3)
EOSINOPHIL # BLD AUTO: 0.07 K/UL — SIGNIFICANT CHANGE UP (ref 0–0.5)
EOSINOPHIL NFR BLD AUTO: 0.8 % — SIGNIFICANT CHANGE UP (ref 0–6)
ERYTHROCYTE [SEDIMENTATION RATE] IN BLOOD: 3 MM/HR — SIGNIFICANT CHANGE UP (ref 0–15)
GLUCOSE SERPL-MCNC: 95 MG/DL — SIGNIFICANT CHANGE UP (ref 70–99)
HCT VFR BLD CALC: 42.3 % — SIGNIFICANT CHANGE UP (ref 39–50)
HGB BLD-MCNC: 14.4 G/DL — SIGNIFICANT CHANGE UP (ref 13–17)
IMM GRANULOCYTES NFR BLD AUTO: 0.3 % — SIGNIFICANT CHANGE UP (ref 0–1.5)
LACTATE SERPL-SCNC: 1.1 MMOL/L — SIGNIFICANT CHANGE UP (ref 0.7–2)
LIDOCAIN IGE QN: 161 U/L — SIGNIFICANT CHANGE UP (ref 73–393)
LYMPHOCYTES # BLD AUTO: 2.75 K/UL — SIGNIFICANT CHANGE UP (ref 1–3.3)
LYMPHOCYTES # BLD AUTO: 30.5 % — SIGNIFICANT CHANGE UP (ref 13–44)
MAGNESIUM SERPL-MCNC: 2.4 MG/DL — SIGNIFICANT CHANGE UP (ref 1.6–2.6)
MCHC RBC-ENTMCNC: 30.8 PG — SIGNIFICANT CHANGE UP (ref 27–34)
MCHC RBC-ENTMCNC: 34 GM/DL — SIGNIFICANT CHANGE UP (ref 32–36)
MCV RBC AUTO: 90.6 FL — SIGNIFICANT CHANGE UP (ref 80–100)
MONOCYTES # BLD AUTO: 0.57 K/UL — SIGNIFICANT CHANGE UP (ref 0–0.9)
MONOCYTES NFR BLD AUTO: 6.3 % — SIGNIFICANT CHANGE UP (ref 2–14)
NEUTROPHILS # BLD AUTO: 5.58 K/UL — SIGNIFICANT CHANGE UP (ref 1.8–7.4)
NEUTROPHILS NFR BLD AUTO: 62 % — SIGNIFICANT CHANGE UP (ref 43–77)
NRBC # BLD: 0 /100 WBCS — SIGNIFICANT CHANGE UP (ref 0–0)
PLATELET # BLD AUTO: 138 K/UL — LOW (ref 150–400)
POTASSIUM SERPL-MCNC: 4.4 MMOL/L — SIGNIFICANT CHANGE UP (ref 3.5–5.3)
POTASSIUM SERPL-SCNC: 4.4 MMOL/L — SIGNIFICANT CHANGE UP (ref 3.5–5.3)
PROT SERPL-MCNC: 7.2 G/DL — SIGNIFICANT CHANGE UP (ref 6–8.3)
RBC # BLD: 4.67 M/UL — SIGNIFICANT CHANGE UP (ref 4.2–5.8)
RBC # FLD: 11.7 % — SIGNIFICANT CHANGE UP (ref 10.3–14.5)
SARS-COV-2 RNA SPEC QL NAA+PROBE: SIGNIFICANT CHANGE UP
SODIUM SERPL-SCNC: 142 MMOL/L — SIGNIFICANT CHANGE UP (ref 135–145)
T4 AB SER-ACNC: 6.9 UG/DL — SIGNIFICANT CHANGE UP (ref 4.6–12)
TROPONIN I SERPL-MCNC: <0.015 NG/ML — SIGNIFICANT CHANGE UP (ref 0–0.04)
TSH SERPL-MCNC: 1.23 UU/ML — SIGNIFICANT CHANGE UP (ref 0.34–4.82)
WBC # BLD: 9.01 K/UL — SIGNIFICANT CHANGE UP (ref 3.8–10.5)
WBC # FLD AUTO: 9.01 K/UL — SIGNIFICANT CHANGE UP (ref 3.8–10.5)

## 2020-11-17 PROCEDURE — 73630 X-RAY EXAM OF FOOT: CPT | Mod: 26,LT

## 2020-11-17 PROCEDURE — 71046 X-RAY EXAM CHEST 2 VIEWS: CPT | Mod: 26

## 2020-11-17 PROCEDURE — 99285 EMERGENCY DEPT VISIT HI MDM: CPT

## 2020-11-17 RX ORDER — MECLIZINE HCL 12.5 MG
12.5 TABLET ORAL ONCE
Refills: 0 | Status: COMPLETED | OUTPATIENT
Start: 2020-11-17 | End: 2020-11-17

## 2020-11-17 RX ORDER — MECLIZINE HCL 12.5 MG
25 TABLET ORAL THREE TIMES A DAY
Refills: 0 | Status: DISCONTINUED | OUTPATIENT
Start: 2020-11-17 | End: 2020-11-20

## 2020-11-17 RX ORDER — SODIUM CHLORIDE 9 MG/ML
1000 INJECTION INTRAMUSCULAR; INTRAVENOUS; SUBCUTANEOUS
Refills: 0 | Status: DISCONTINUED | OUTPATIENT
Start: 2020-11-17 | End: 2020-11-20

## 2020-11-17 RX ORDER — ASPIRIN/CALCIUM CARB/MAGNESIUM 324 MG
162 TABLET ORAL ONCE
Refills: 0 | Status: COMPLETED | OUTPATIENT
Start: 2020-11-17 | End: 2020-11-17

## 2020-11-17 RX ORDER — MORPHINE SULFATE 50 MG/1
4 CAPSULE, EXTENDED RELEASE ORAL ONCE
Refills: 0 | Status: DISCONTINUED | OUTPATIENT
Start: 2020-11-17 | End: 2020-11-17

## 2020-11-17 RX ORDER — ENOXAPARIN SODIUM 100 MG/ML
40 INJECTION SUBCUTANEOUS DAILY
Refills: 0 | Status: DISCONTINUED | OUTPATIENT
Start: 2020-11-17 | End: 2020-11-20

## 2020-11-17 RX ORDER — ACETAMINOPHEN 500 MG
650 TABLET ORAL EVERY 6 HOURS
Refills: 0 | Status: DISCONTINUED | OUTPATIENT
Start: 2020-11-17 | End: 2020-11-20

## 2020-11-17 RX ORDER — CEFTRIAXONE 500 MG/1
1000 INJECTION, POWDER, FOR SOLUTION INTRAMUSCULAR; INTRAVENOUS ONCE
Refills: 0 | Status: COMPLETED | OUTPATIENT
Start: 2020-11-17 | End: 2020-11-17

## 2020-11-17 RX ORDER — CEFAZOLIN SODIUM 1 G
1000 VIAL (EA) INJECTION EVERY 8 HOURS
Refills: 0 | Status: DISCONTINUED | OUTPATIENT
Start: 2020-11-17 | End: 2020-11-20

## 2020-11-17 RX ORDER — ONDANSETRON 8 MG/1
4 TABLET, FILM COATED ORAL ONCE
Refills: 0 | Status: COMPLETED | OUTPATIENT
Start: 2020-11-17 | End: 2020-11-17

## 2020-11-17 RX ORDER — ASPIRIN/CALCIUM CARB/MAGNESIUM 324 MG
81 TABLET ORAL DAILY
Refills: 0 | Status: DISCONTINUED | OUTPATIENT
Start: 2020-11-18 | End: 2020-11-20

## 2020-11-17 RX ORDER — ATORVASTATIN CALCIUM 80 MG/1
40 TABLET, FILM COATED ORAL AT BEDTIME
Refills: 0 | Status: DISCONTINUED | OUTPATIENT
Start: 2020-11-17 | End: 2020-11-20

## 2020-11-17 RX ADMIN — CEFTRIAXONE 100 MILLIGRAM(S): 500 INJECTION, POWDER, FOR SOLUTION INTRAMUSCULAR; INTRAVENOUS at 21:26

## 2020-11-17 RX ADMIN — Medication 162 MILLIGRAM(S): at 18:54

## 2020-11-17 RX ADMIN — SODIUM CHLORIDE 125 MILLILITER(S): 9 INJECTION INTRAMUSCULAR; INTRAVENOUS; SUBCUTANEOUS at 18:54

## 2020-11-17 RX ADMIN — Medication 12.5 MILLIGRAM(S): at 19:00

## 2020-11-17 RX ADMIN — ONDANSETRON 4 MILLIGRAM(S): 8 TABLET, FILM COATED ORAL at 21:26

## 2020-11-17 RX ADMIN — MORPHINE SULFATE 4 MILLIGRAM(S): 50 CAPSULE, EXTENDED RELEASE ORAL at 21:26

## 2020-11-17 NOTE — H&P ADULT - PROBLEM SELECTOR PLAN 1
Pt pw left sided chest pain and palpitations   Troponins neg x1 fu t2 and t3   will start on ASA, statin, will hold bbk as patient is mildly rose   EKG sinus bradycardia   will place on telemonitor   low Heart and MEHREEN scores   fu echo  Cardio Dr. Castillo Pt pw left sided chest pain and palpitations   Troponins neg x1 fu t2 and t3   will start on ASA, statin, will hold bbk as patient is mildly rose   EKG sinus bradycardia   will place on telemonitor   low Heart and MEHREEN scores   patient for stress test in am   Cardio Dr. Castillo

## 2020-11-17 NOTE — H&P ADULT - HISTORY OF PRESENT ILLNESS
40 y.o. male c/o constant feeling dizzy for 2 weeks, positional vertigo when turns head, on & off palpitations for past 1 week, CP with palpitations, @ times radiated to Lt arm, no n/v, fever, no recent cold, ear infection, SOB, coughing, COVID exposure.  Pt took Russian meds with some relief.  Pt also c/o Lt lat foot pain since Sat. 41 yo male with hx of GERD pw multiple complaints and left sided chest pain associated with chest palpitations x2 weeks. Patient states its associated with radiation to left arm. Pt endorses feeling dizzy for 2 weeks, positional vertigo when turns head. Pt also states main reason for coming to hospital was left foot pain which started 5 days ago. Pt denies any trauma or exacerbating factors. pain is sharp and not improved with anything.  Pt has no n/v, fever, no recent cold, ear infection, SOB, coughing, COVID exposure.

## 2020-11-17 NOTE — ED PROVIDER NOTE - OBJECTIVE STATEMENT
40 y.o. male c/o constant feeling dizzy for 2 weeks, positional vertigo when turns head, on & off palpitations for past 1 week, CP with palpitations, no n/v, fever, no recent cold, ear infection, SOB, coughing, COVID exposure.  Pt took Russian meds with some relief.  Pt also c/o Lt lat foot pain since Sat., denies any injury.  Took Ibuprofen with relief. Last took 5pm 40 y.o. male c/o constant feeling dizzy for 2 weeks, positional vertigo when turns head, on & off palpitations for past 1 week, CP with palpitations, @ times radiated to Lt arm, no n/v, fever, no recent cold, ear infection, SOB, coughing, COVID exposure.  Pt took Russian meds with some relief.  Pt also c/o Lt lat foot pain since Sat., denies any injury.  Took Ibuprofen with relief. Last took 5pm

## 2020-11-17 NOTE — H&P ADULT - NSHPPOADEEPVENOUSTHROMB_GEN_A_CORE
Discharge Summary (SDC)





- Discharge


Final Diagnosis: 





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


Date of Surgery: 02/11/20


Condition: Good


Treatment or Instructions: 


 


 


 


 


 


 


 


 


 


Leave the top dressing on for 2 days, then removed.


 


Leave the steri-strip tapes on for 5 days, then removal.


 


Then cleaning wound with peroxide and apply Neosporin/bacitracin 3 times per 

day.


 


Antibiotics for 1 day, then discontinue.


 


Elevate operative area to decrease swelling.


 


Do not strain, or lift heavy objects.


 


Call for excessive bleeding, increased temperature of 101, uncontrolled pain, 

or excessive nausea or vomiting.


You may reach Dr. Santos through his office at [phone].


In the event of an emergency after hours, then contact Dr. Santos through [facility].


 


Return to the office for a postop check on Thursday.  The time will be scheduled

by the nursing staff  of [facility] prior to discharge.


 


 


Please give the patient a copy of their labs and EKG so they can bring this to 

their PMD.


Thank you


 


Portions of this note may be dictated using Dragon voice recognition software.


Occasional variations and spelling and vocabulary could be possible and are 

unintentional.


Additionally, there is a chance that some errors may not be caught or corrected.


Please notify the offer of any discrepancies noted or if any statements are 

unclear.


 


Referrals: 


DAMASO OSORIO PA-C [Primary Care Provider] - 


Discharge Diet: As Tolerated


Discharge Activity: No Lifting/Push/Pulling


Report the Following to Your Physician Immediately: Unusual Bleeding - Keep leg 

elevated. No strenuous activities.
no

## 2020-11-17 NOTE — ED PROVIDER NOTE - MUSCULOSKELETAL, MLM
Spine appears normal, range of motion is not limited, Lt foot- lat aspect-tenderness to palp., warm to touch Spine appears normal, range of motion is not limited, Lt foot- dorsal/lat aspect-tenderness to palp., warm to touch, erythema, no fluctuant

## 2020-11-17 NOTE — ED ADULT NURSE NOTE - OBJECTIVE STATEMENT
pt from home c/o of palpitation, dizziness and Lt foot pain x1 week, Lt foot anterior redness skin dry and intact

## 2020-11-17 NOTE — H&P ADULT - ASSESSMENT
41 yo male with hx of GERD pw multiple complaints and left sided chest pain associated with chest palpitations x2 weeks

## 2020-11-17 NOTE — CONSULT NOTE ADULT - SUBJECTIVE AND OBJECTIVE BOX
Time of visit:    CHIEF COMPLAINT: Patient is a 40y old  Male who presents with a chief complaint of     HPI:   Patient seen and examined.     PAST MEDICAL & SURGICAL HISTORY:  GERD (gastroesophageal reflux disease)    S/P appendectomy        Allergies    No Known Allergies    Intolerances        MEDICATIONS  (STANDING):  cefTRIAXone   IVPB 1000 milliGRAM(s) IV Intermittent once  morphine  - Injectable 4 milliGRAM(s) IV Push Once  ondansetron Injectable 4 milliGRAM(s) IV Push once  sodium chloride 0.9%. 1000 milliLiter(s) (125 mL/Hr) IV Continuous <Continuous>      MEDICATIONS  (PRN):   Medications up to date at time of exam.    Medications up to date at time of exam.    FAMILY HISTORY:  No pertinent family history in first degree relatives        SOCIAL HISTORY  Smoking History: [   ] smoking/smoke exposure, [   ] former smoker  Living Condition: [   ] apartment, [   ] private house  Work History:   Travel History: denies recent travel  Illicit Substance Use: denies  Alcohol Use: denies    REVIEW OF SYSTEMS:    CONSTITUTIONAL:  denies fevers, chills, sweats, weight loss    HEENT:  denies diplopia or blurred vision, sore throat or runny nose.    CARDIOVASCULAR:  denies pressure, squeezing, tightness, or heaviness about the chest; no palpitations.    RESPIRATORY:  denies SOB, cough, ESTRELLA, wheezing.    GASTROINTESTINAL:  denies abdominal pain, nausea, vomiting or diarrhea.    GENITOURINARY: denies dysuria, frequency or urgency.    NEUROLOGIC:  denies numbness, tingling, seizures or weakness.    PSYCHIATRIC:  denies disorder of thought or mood.    MSK: denies swelling, redness      PHYSICAL EXAMINATION:    GENERAL: The patient is a well-developed, well-nourished, in no apparent distress.     Vital Signs Last 24 Hrs  T(C): 36.7 (17 Nov 2020 17:20), Max: 36.7 (17 Nov 2020 17:20)  T(F): 98 (17 Nov 2020 17:20), Max: 98 (17 Nov 2020 17:20)  HR: 70 (17 Nov 2020 17:20) (70 - 70)  BP: 118/80 (17 Nov 2020 17:20) (118/80 - 118/80)  BP(mean): --  RR: 16 (17 Nov 2020 17:20) (16 - 16)  SpO2: 98% (17 Nov 2020 17:20) (98% - 98%)   (if applicable)    Chest Tube (if applicable)    HEENT: Head is normocephalic and atraumatic. Extraocular muscles are intact. Mucous membranes are moist.     NECK: Supple, no palpable adenopathy.    LUNGS: Clear to auscultation, no wheezing, rales, or rhonchi.    HEART: Regular rate and rhythm without murmur.    ABDOMEN: Soft, nontender, and nondistended.  No hepatosplenomegaly is noted.    RENAL: No difficulty voiding, no pelvic pain    EXTREMITIES: Without any cyanosis, clubbing, rash, lesions or edema.    NEUROLOGIC: Awake, alert, oriented, grossly intact    SKIN: Warm, dry, good turgor.      LABS:                        14.4   9.01  )-----------( 138      ( 17 Nov 2020 18:48 )             42.3     11-17    142  |  108  |  11  ----------------------------<  95  4.4   |  28  |  0.96    Ca    9.2      17 Nov 2020 18:48  Mg     2.4     11-17    TPro  7.2  /  Alb  4.1  /  TBili  0.7  /  DBili  x   /  AST  16  /  ALT  37  /  AlkPhos  58  11-17          CARDIAC MARKERS ( 17 Nov 2020 18:48 )  <0.015 ng/mL / x     / 71 U/L / x     / x                    MICROBIOLOGY: (if applicable)    RADIOLOGY & ADDITIONAL STUDIES:  EKG:   CXR:  ECHO:    IMPRESSION: 40y Male PAST MEDICAL & SURGICAL HISTORY:  GERD (gastroesophageal reflux disease)    S/P appendectomy     p/w                   RECOMMENDATIONS:   Time of visit:    CHIEF COMPLAINT: Patient is a 40y old  Male who presents with a chief complaint of     HPI:   This is 40 yr old man male with  GERD pw multiple complaints and left sided chest pain associated with chest palpitations x2 weeks. Patient states its associated with radiation to left arm. Pt endorses feeling dizzy for 2 weeks, positional vertigo when turns head. Pt also states main reason for coming to hospital was left foot pain which started 5 days ago. Pt denies any trauma or exacerbating factors. pain is sharp and not improved with anything.  Pt has no n/v, fever, no recent cold, ear infection. Pat stated his chest pain/ palpitation with SOB occurred after smoking marijuana       PAST MEDICAL & SURGICAL HISTORY:  GERD (gastroesophageal reflux disease)    S/P appendectomy        Allergies    No Known Allergies    Intolerances        MEDICATIONS  (STANDING):  cefTRIAXone   IVPB 1000 milliGRAM(s) IV Intermittent once  morphine  - Injectable 4 milliGRAM(s) IV Push Once  ondansetron Injectable 4 milliGRAM(s) IV Push once  sodium chloride 0.9%. 1000 milliLiter(s) (125 mL/Hr) IV Continuous <Continuous>      MEDICATIONS  (PRN):   Medications up to date at time of exam.    Medications up to date at time of exam.    FAMILY HISTORY:  Father .. 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    CARDIOVASCULAR:  denies pressure, squeezing, tightness, or heaviness about the chest; no palpitations.    RESPIRATORY:  denies SOB, cough, ESTRELLA, wheezing.    GASTROINTESTINAL:  denies abdominal pain, nausea, vomiting or diarrhea.    GENITOURINARY: denies dysuria, frequency or urgency.    NEUROLOGIC:  denies numbness, tingling, seizures or weakness.    PSYCHIATRIC:  denies disorder of thought or mood.    MSK: denies swelling, redness      PHYSICAL EXAMINATION:    GENERAL: The patient is a well-developed, well-nourished, in no apparent distress.     Vital Signs Last 24 Hrs  T(C): 36.7 (17 Nov 2020 17:20), Max: 36.7 (17 Nov 2020 17:20)  T(F): 98 (17 Nov 2020 17:20), Max: 98 (17 Nov 2020 17:20)  HR: 70 (17 Nov 2020 17:20) (70 - 70)  BP: 118/80 (17 Nov 2020 17:20) (118/80 - 118/80)  BP(mean): --  RR: 16 (17 Nov 2020 17:20) (16 - 16)  SpO2: 98% (17 Nov 2020 17:20) (98% - 98%)   (if applicable)    Chest Tube (if applicable)    HEENT: Head is normocephalic and atraumatic. Extraocular muscles are intact. Mucous membranes are moist.     NECK: Supple, no palpable adenopathy.    LUNGS: Clear to auscultation, no wheezing, rales, or rhonchi.    HEART: Regular rate and rhythm without murmur.    ABDOMEN: Soft, nontender, and nondistended.  No hepatosplenomegaly is noted.    RENAL: No difficulty voiding, no pelvic pain    EXTREMITIES: Without any cyanosis, clubbing, rash, lesions or edema.    NEUROLOGIC: Awake, alert, oriented, grossly intact    SKIN: Warm, dry, good turgor.      LABS:                        14.4   9.01  )-----------( 138      ( 17 Nov 2020 18:48 )             42.3     11-17    142  |  108  |  11  ----------------------------<  95  4.4   |  28  |  0.96    Ca    9.2      17 Nov 2020 18:48  Mg     2.4     11-17    TPro  7.2  /  Alb  4.1  /  TBili  0.7  /  DBili  x   /  AST  16  /  ALT  37  /  AlkPhos  58  11-17          CARDIAC MARKERS ( 17 Nov 2020 18:48 )  <0.015 ng/mL / x     / 71 U/L / x     / x                    MICROBIOLOGY: (if applicable)    RADIOLOGY & ADDITIONAL STUDIES:  EKG:   CXR:  < from: Xray Chest 2 Views PA/Lat (11.17.20 @ 19:20) >    EXAM:  XR CHEST PA LAT 2V                            PROCEDURE DATE:  11/17/2020          INTERPRETATION:  Palpitations, chest pain.    PA lateral.    Heart and mediastinum normal.  Lungs clear.  Costophrenic angles sharp bilaterally.    IMPRESSION: Normal chest            OCTAVIO SANCHEZ MD; Attending Radiologist  This document has been electronically signed. Nov 17 2020  7:47PM    < end of copied text >  ECHO:    IMPRESSION: 40y Male PAST MEDICAL & SURGICAL HISTORY:  GERD (gastroesophageal reflux disease)    S/P appendectomy     p/w         IMP:  This is 40 yr old man  with  GERD pw multiple complaints and left sided chest pain associated with chest palpitations x2 weeks. Patient states its associated with radiation to left arm. Pt endorses feeling dizzy for 2 weeks, positional vertigo when turns head. Pt also states main reason for coming to hospital was left foot pain which started 5 days ago. Pt denies any trauma or exacerbating factors. pain is sharp and not improved with anything.  Pt has no n/v, fever, no recent cold, ear infection. Pat stated his chest pain/ palpitation with SOB occurred after smoking marijuana . SOB is due to palpitation       Sugg:  -advise to stop marijuana use  -admit to tele  -ACS work up  -ASA. Statin  -cards eval   -podiatry eval  -doppler of b/l lower ext  -cards eval    d/c with ER attend / PCP

## 2020-11-17 NOTE — H&P ADULT - PROBLEM SELECTOR PLAN 4
IMPROVE VTE Individual Risk Assessment  RISK                                                         Points  [  ] Previous VTE                                      3  [  ] Thrombophilia                                   2  [  ] Lower limb paralysis                         2 (unable to hold up >15 seconds)    [  ] Current Cancer                                  2       (within 6 months)  [ x ] Immobilization > 24 hrs                    1  [  ] ICU/CCU stay > 24 hrs                         1  [  ] Age > 60                                              1  cw lovenox for dvt ppx

## 2020-11-17 NOTE — H&P ADULT - NSHPPHYSICALEXAM_GEN_ALL_CORE
GENERAL: NAD, lying in bed comfortably  HEAD:  Atraumatic, Normocephalic  EYES: EOMI, PERRLA, conjunctiva and sclera clear  ENT: Moist mucous membranes  NECK: Supple, No JVD  CHEST/LUNG: Clear to auscultation bilaterally; No rales, rhonchi, wheezing, or rubs.  HEART: Regular rate and rhythm; S1+ S2+  ABDOMEN: Bowel sounds present; Soft, Nontender, Nondistended. No hepatomegaly  EXTREMITIES:  2+ Peripheral Pulses, brisk capillary refill. No clubbing, cyanosis, or edema. Left foot with some erythema and warmth, + tenderness on palpation  NERVOUS SYSTEM:  Alert & Oriented X3, speech clear. No deficits   MSK: FROM all 4 extremities, full and equal strength  SKIN: No rashes or lesions

## 2020-11-17 NOTE — ED PROVIDER NOTE - CARE PLAN
Principal Discharge DX:	ACS (acute coronary syndrome)  Secondary Diagnosis:	Cellulitis  Secondary Diagnosis:	Vertigo

## 2020-11-17 NOTE — H&P ADULT - PROBLEM SELECTOR PLAN 3
pt with complaints of vertigo   ddx BPPV   improved on physical examination   will cw meclizine 25 q8h

## 2020-11-17 NOTE — ED PROVIDER NOTE - PROGRESS NOTE DETAILS
Pt with ACS, Lt foot cellulitis, will admit pt.  Case d/w Dr. Swan, advised to admit to Dr. Teran, case d/w Dr. Teran, advised to admit pt to Dr. Gonzalez Case d/w Dr. Gonzalez, will admit to Dr. Leggett

## 2020-11-17 NOTE — H&P ADULT - PROBLEM SELECTOR PLAN 2
Pt with left foot pain + erythema   no wbc or fevers/ signs of severe infection   will start pt on ancef   fu blood cultures

## 2020-11-18 DIAGNOSIS — K21.9 GASTRO-ESOPHAGEAL REFLUX DISEASE WITHOUT ESOPHAGITIS: ICD-10-CM

## 2020-11-18 DIAGNOSIS — R00.2 PALPITATIONS: ICD-10-CM

## 2020-11-18 DIAGNOSIS — I24.9 ACUTE ISCHEMIC HEART DISEASE, UNSPECIFIED: ICD-10-CM

## 2020-11-18 DIAGNOSIS — R42 DIZZINESS AND GIDDINESS: ICD-10-CM

## 2020-11-18 DIAGNOSIS — L03.90 CELLULITIS, UNSPECIFIED: ICD-10-CM

## 2020-11-18 DIAGNOSIS — Z29.9 ENCOUNTER FOR PROPHYLACTIC MEASURES, UNSPECIFIED: ICD-10-CM

## 2020-11-18 LAB
A1C WITH ESTIMATED AVERAGE GLUCOSE RESULT: 4.8 % — SIGNIFICANT CHANGE UP (ref 4–5.6)
ANION GAP SERPL CALC-SCNC: 5 MMOL/L — SIGNIFICANT CHANGE UP (ref 5–17)
BUN SERPL-MCNC: 9 MG/DL — SIGNIFICANT CHANGE UP (ref 7–18)
CALCIUM SERPL-MCNC: 9 MG/DL — SIGNIFICANT CHANGE UP (ref 8.4–10.5)
CHLORIDE SERPL-SCNC: 109 MMOL/L — HIGH (ref 96–108)
CHOLEST SERPL-MCNC: 97 MG/DL — SIGNIFICANT CHANGE UP
CO2 SERPL-SCNC: 29 MMOL/L — SIGNIFICANT CHANGE UP (ref 22–31)
CREAT SERPL-MCNC: 0.92 MG/DL — SIGNIFICANT CHANGE UP (ref 0.5–1.3)
ESTIMATED AVERAGE GLUCOSE: 91 MG/DL — SIGNIFICANT CHANGE UP (ref 68–114)
FOLATE SERPL-MCNC: 13.2 NG/ML — SIGNIFICANT CHANGE UP
GLUCOSE SERPL-MCNC: 88 MG/DL — SIGNIFICANT CHANGE UP (ref 70–99)
HCT VFR BLD CALC: 41.2 % — SIGNIFICANT CHANGE UP (ref 39–50)
HDLC SERPL-MCNC: 30 MG/DL — LOW
HGB BLD-MCNC: 13.7 G/DL — SIGNIFICANT CHANGE UP (ref 13–17)
LIPID PNL WITH DIRECT LDL SERPL: 14 MG/DL — SIGNIFICANT CHANGE UP
MAGNESIUM SERPL-MCNC: 2.4 MG/DL — SIGNIFICANT CHANGE UP (ref 1.6–2.6)
MCHC RBC-ENTMCNC: 30.5 PG — SIGNIFICANT CHANGE UP (ref 27–34)
MCHC RBC-ENTMCNC: 33.3 GM/DL — SIGNIFICANT CHANGE UP (ref 32–36)
MCV RBC AUTO: 91.8 FL — SIGNIFICANT CHANGE UP (ref 80–100)
NON HDL CHOLESTEROL: 67 MG/DL — SIGNIFICANT CHANGE UP
NRBC # BLD: 0 /100 WBCS — SIGNIFICANT CHANGE UP (ref 0–0)
PHOSPHATE SERPL-MCNC: 3.6 MG/DL — SIGNIFICANT CHANGE UP (ref 2.5–4.5)
PLATELET # BLD AUTO: 134 K/UL — LOW (ref 150–400)
POTASSIUM SERPL-MCNC: 4.6 MMOL/L — SIGNIFICANT CHANGE UP (ref 3.5–5.3)
POTASSIUM SERPL-SCNC: 4.6 MMOL/L — SIGNIFICANT CHANGE UP (ref 3.5–5.3)
RBC # BLD: 4.49 M/UL — SIGNIFICANT CHANGE UP (ref 4.2–5.8)
RBC # FLD: 11.7 % — SIGNIFICANT CHANGE UP (ref 10.3–14.5)
SARS-COV-2 IGG SERPL QL IA: NEGATIVE — SIGNIFICANT CHANGE UP
SARS-COV-2 IGM SERPL IA-ACNC: <0.1 INDEX — SIGNIFICANT CHANGE UP
SODIUM SERPL-SCNC: 143 MMOL/L — SIGNIFICANT CHANGE UP (ref 135–145)
T3 SERPL-MCNC: 89 NG/DL — SIGNIFICANT CHANGE UP (ref 80–200)
TRIGL SERPL-MCNC: 264 MG/DL — HIGH
TROPONIN I SERPL-MCNC: <0.015 NG/ML — SIGNIFICANT CHANGE UP (ref 0–0.04)
TROPONIN I SERPL-MCNC: <0.015 NG/ML — SIGNIFICANT CHANGE UP (ref 0–0.04)
TSH SERPL-MCNC: 1.74 UU/ML — SIGNIFICANT CHANGE UP (ref 0.34–4.82)
VIT B12 SERPL-MCNC: 596 PG/ML — SIGNIFICANT CHANGE UP (ref 232–1245)
WBC # BLD: 7.67 K/UL — SIGNIFICANT CHANGE UP (ref 3.8–10.5)
WBC # FLD AUTO: 7.67 K/UL — SIGNIFICANT CHANGE UP (ref 3.8–10.5)

## 2020-11-18 PROCEDURE — 93970 EXTREMITY STUDY: CPT | Mod: 26

## 2020-11-18 RX ORDER — KETOROLAC TROMETHAMINE 30 MG/ML
30 SYRINGE (ML) INJECTION ONCE
Refills: 0 | Status: DISCONTINUED | OUTPATIENT
Start: 2020-11-18 | End: 2020-11-18

## 2020-11-18 RX ORDER — PANTOPRAZOLE SODIUM 20 MG/1
40 TABLET, DELAYED RELEASE ORAL
Refills: 0 | Status: DISCONTINUED | OUTPATIENT
Start: 2020-11-18 | End: 2020-11-20

## 2020-11-18 RX ORDER — KETOROLAC TROMETHAMINE 30 MG/ML
30 SYRINGE (ML) INJECTION EVERY 8 HOURS
Refills: 0 | Status: DISCONTINUED | OUTPATIENT
Start: 2020-11-18 | End: 2020-11-19

## 2020-11-18 RX ORDER — TRAMADOL HYDROCHLORIDE 50 MG/1
25 TABLET ORAL EVERY 8 HOURS
Refills: 0 | Status: DISCONTINUED | OUTPATIENT
Start: 2020-11-18 | End: 2020-11-19

## 2020-11-18 RX ORDER — LIDOCAINE 4 G/100G
1 CREAM TOPICAL DAILY
Refills: 0 | Status: DISCONTINUED | OUTPATIENT
Start: 2020-11-18 | End: 2020-11-19

## 2020-11-18 RX ADMIN — MORPHINE SULFATE 4 MILLIGRAM(S): 50 CAPSULE, EXTENDED RELEASE ORAL at 01:20

## 2020-11-18 RX ADMIN — Medication 30 MILLIGRAM(S): at 18:20

## 2020-11-18 RX ADMIN — TRAMADOL HYDROCHLORIDE 25 MILLIGRAM(S): 50 TABLET ORAL at 01:21

## 2020-11-18 RX ADMIN — Medication 30 MILLIGRAM(S): at 09:09

## 2020-11-18 RX ADMIN — TRAMADOL HYDROCHLORIDE 25 MILLIGRAM(S): 50 TABLET ORAL at 10:00

## 2020-11-18 RX ADMIN — Medication 30 MILLIGRAM(S): at 17:24

## 2020-11-18 RX ADMIN — ATORVASTATIN CALCIUM 40 MILLIGRAM(S): 80 TABLET, FILM COATED ORAL at 23:50

## 2020-11-18 RX ADMIN — PANTOPRAZOLE SODIUM 40 MILLIGRAM(S): 20 TABLET, DELAYED RELEASE ORAL at 06:36

## 2020-11-18 RX ADMIN — TRAMADOL HYDROCHLORIDE 25 MILLIGRAM(S): 50 TABLET ORAL at 00:59

## 2020-11-18 RX ADMIN — TRAMADOL HYDROCHLORIDE 25 MILLIGRAM(S): 50 TABLET ORAL at 17:24

## 2020-11-18 RX ADMIN — TRAMADOL HYDROCHLORIDE 25 MILLIGRAM(S): 50 TABLET ORAL at 18:20

## 2020-11-18 RX ADMIN — Medication 100 MILLIGRAM(S): at 05:27

## 2020-11-18 RX ADMIN — TRAMADOL HYDROCHLORIDE 25 MILLIGRAM(S): 50 TABLET ORAL at 09:10

## 2020-11-18 RX ADMIN — Medication 100 MILLIGRAM(S): at 23:03

## 2020-11-18 RX ADMIN — Medication 100 MILLIGRAM(S): at 15:28

## 2020-11-18 RX ADMIN — Medication 25 MILLIGRAM(S): at 15:28

## 2020-11-18 RX ADMIN — Medication 650 MILLIGRAM(S): at 00:58

## 2020-11-18 RX ADMIN — Medication 650 MILLIGRAM(S): at 04:13

## 2020-11-18 RX ADMIN — ENOXAPARIN SODIUM 40 MILLIGRAM(S): 100 INJECTION SUBCUTANEOUS at 12:24

## 2020-11-18 RX ADMIN — Medication 81 MILLIGRAM(S): at 12:25

## 2020-11-18 RX ADMIN — Medication 25 MILLIGRAM(S): at 05:29

## 2020-11-18 RX ADMIN — SODIUM CHLORIDE 125 MILLILITER(S): 9 INJECTION INTRAMUSCULAR; INTRAVENOUS; SUBCUTANEOUS at 23:04

## 2020-11-18 RX ADMIN — Medication 25 MILLIGRAM(S): at 23:03

## 2020-11-18 RX ADMIN — Medication 30 MILLIGRAM(S): at 10:15

## 2020-11-18 NOTE — PROGRESS NOTE ADULT - PROBLEM SELECTOR PLAN 2
Trop negative x 3   EKG shows Sinus Arturo  c/w ASA, statin  Outpatient Stress Test as pt refusing at this time.  Appreciate Cardiology input

## 2020-11-18 NOTE — PATIENT PROFILE ADULT - NSTOBACCOWITHDRW_GEN_A_CORE_SD
Routing refill request to provider for review/approval because:  Labs not current:  Creatinine, potassium   Patient failed:  Blood pressure under 140/90 in past 12 months      BP Readings from Last 3 Encounters:   02/21/20 (!) 140/77   02/05/20 (!) 176/88   06/05/19 118/74     Baylee Perry BSN, RN     none                                                          ................[phone]

## 2020-11-18 NOTE — CONSULT NOTE ADULT - SUBJECTIVE AND OBJECTIVE BOX
HPI:  39 yo male with hx of GERD pw multiple complaints and left sided chest pain associated with chest palpitations x2 weeks. Patient states its associated with radiation to left arm. Pt endorses feeling dizzy for 2 weeks, positional vertigo when turns head. Pt also states main reason for coming to hospital was left foot pain which started 5 days ago. Pt denies any trauma or exacerbating factors. pain is sharp and not improved with anything.  Pt has no n/v, fever, no recent cold, ear infection, SOB, coughing, COVID exposure.    (17 Nov 2020 23:14)      ICU Vital Signs Last 24 Hrs  T(C): 36.7 (18 Nov 2020 16:51), Max: 36.8 (17 Nov 2020 22:13)  T(F): 98.1 (18 Nov 2020 16:51), Max: 98.2 (17 Nov 2020 22:13)  HR: 49 (18 Nov 2020 16:51) (49 - 89)  BP: 107/69 (18 Nov 2020 16:51) (101/54 - 111/76)  BP(mean): --  ABP: --  ABP(mean): --  RR: 18 (18 Nov 2020 16:51) (18 - 18)  SpO2: 97% (18 Nov 2020 16:51) (97% - 100%)      Vital Signs Last 24 Hrs  T(C): 36.7 (18 Nov 2020 16:51), Max: 36.8 (17 Nov 2020 22:13)  T(F): 98.1 (18 Nov 2020 16:51), Max: 98.2 (17 Nov 2020 22:13)  HR: 49 (18 Nov 2020 16:51) (49 - 89)  BP: 107/69 (18 Nov 2020 16:51) (101/54 - 111/76)  BP(mean): --  RR: 18 (18 Nov 2020 16:51) (18 - 18)  SpO2: 97% (18 Nov 2020 16:51) (97% - 100%)                        13.7   7.67  )-----------( 134      ( 18 Nov 2020 06:30 )             41.2     11-18    143  |  109<H>  |  9   ----------------------------<  88  4.6   |  29  |  0.92    Ca    9.0      18 Nov 2020 06:30  Phos  3.6     11-18  Mg     2.4     11-18    TPro  7.2  /  Alb  4.1  /  TBili  0.7  /  DBili  x   /  AST  16  /  ALT  37  /  AlkPhos  58  11-17        CAPILLARY BLOOD GLUCOSE          MEDICATIONS  (STANDING):  aspirin enteric coated 81 milliGRAM(s) Oral daily  atorvastatin 40 milliGRAM(s) Oral at bedtime  ceFAZolin   IVPB 1000 milliGRAM(s) IV Intermittent every 8 hours  enoxaparin Injectable 40 milliGRAM(s) SubCutaneous daily  lidocaine   Patch 1 Patch Transdermal daily  meclizine 25 milliGRAM(s) Oral three times a day  pantoprazole    Tablet 40 milliGRAM(s) Oral before breakfast  sodium chloride 0.9%. 1000 milliLiter(s) (125 mL/Hr) IV Continuous <Continuous>    MEDICATIONS  (PRN):  acetaminophen   Tablet .. 650 milliGRAM(s) Oral every 6 hours PRN Mild Pain (1 - 3)  ketorolac   Injectable 30 milliGRAM(s) IV Push every 8 hours PRN Severe Pain (7 - 10)  traMADol 25 milliGRAM(s) Oral every 8 hours PRN Moderate Pain (4 - 6)    Allergies    No Known Allergies    Intolerances      PAST MEDICAL & SURGICAL HISTORY:  GERD (gastroesophageal reflux disease)    S/P appendectomy      Social History:  Alcohol: occasional   Smoking: vape pen +   Illicit drugs: Denied  Previous Profession: (17 Nov 2020 23:14)  < from: US Duplex Venous Lower Ext Complete, Bilateral (11.18.20 @ 16:18) >    EXAM:  US DPLX LWR EXT VEINS COMPL BI                            PROCEDURE DATE:  11/18/2020          INTERPRETATION:  CLINICAL INFORMATION: Acute ischemic heart disease, evaluate DVT, left foot pain x3 days    COMPARISON: None available.    TECHNIQUE: Duplex sonography of the BILATERAL LOWER extremity veins with color and spectral Doppler, with and without compression.    FINDINGS:    There is normal compressibility of the bilateral common femoral, femoral and popliteal veins.  Doppler examination shows normal spontaneous and phasic flow.    No calf vein thrombosis is detected.    IMPRESSION:  No evidence of deep venous thrombosis in either lower extremity.                BECKY MEREDITH MD; Attending Radiologist  This document has been electronically signed. Nov 18 2020  4:20PM    < end of copied text >  < from: Xray Foot AP + Lateral + Oblique, Left (11.17.20 @ 19:21) >    EXAM:  FOOT LEFT (MINIMUM 3 VIEWS)                            PROCEDURE DATE:  11/17/2020          INTERPRETATION:  CLINICAL INDICATION: 40 years Male with Lt lat foot pain.    COMPARISON: None    AP, lateral and oblique radiographs of the left foot were obtained.    There is no fracture, subluxation or dislocation. No lytic or blastic lesions are identified.    There is no periosteal reaction or cortical disruption to suggest osteomyelitis.    The osseous mineralization is normal.    No soft tissue abnormality is seen.    IMPRESSION:    Normal Study              PAUL GONZÁLES MD; Attending Radiologist  This document has been electronically signed. Nov 18 2020  8:42AM    < end of copied text >  pt seen and evaluated in ED   AAOx3      reports pain in his left foot since Friday night / Saturday morning    denies any trauma , describes pain as sharp   recalls history of bone fractures in the same foot few years ago ?   exam lower extremities:   vasc:   DP/PT -palpable   TG -WNL  CRF-WNL  Neuro:   sensation grossly intact   Derm:   no open skin no drainage   pedal hair growth -WNL   MS:   pain tenderness left foot cuboid   met 3, 4, 5   small area of skin erythema on the dorsum of the foot   as per pt improved since yesterday              HPI:  39 yo male with hx of GERD pw multiple complaints and left sided chest pain associated with chest palpitations x2 weeks. Patient states its associated with radiation to left arm. Pt endorses feeling dizzy for 2 weeks, positional vertigo when turns head. Pt also states main reason for coming to hospital was left foot pain which started 5 days ago. Pt denies any trauma or exacerbating factors. pain is sharp and not improved with anything.  Pt has no n/v, fever, no recent cold, ear infection, SOB, coughing, COVID exposure.    (17 Nov 2020 23:14)      ICU Vital Signs Last 24 Hrs  T(C): 36.7 (18 Nov 2020 16:51), Max: 36.8 (17 Nov 2020 22:13)  T(F): 98.1 (18 Nov 2020 16:51), Max: 98.2 (17 Nov 2020 22:13)  HR: 49 (18 Nov 2020 16:51) (49 - 89)  BP: 107/69 (18 Nov 2020 16:51) (101/54 - 111/76)  BP(mean): --  ABP: --  ABP(mean): --  RR: 18 (18 Nov 2020 16:51) (18 - 18)  SpO2: 97% (18 Nov 2020 16:51) (97% - 100%)      Vital Signs Last 24 Hrs  T(C): 36.7 (18 Nov 2020 16:51), Max: 36.8 (17 Nov 2020 22:13)  T(F): 98.1 (18 Nov 2020 16:51), Max: 98.2 (17 Nov 2020 22:13)  HR: 49 (18 Nov 2020 16:51) (49 - 89)  BP: 107/69 (18 Nov 2020 16:51) (101/54 - 111/76)  BP(mean): --  RR: 18 (18 Nov 2020 16:51) (18 - 18)  SpO2: 97% (18 Nov 2020 16:51) (97% - 100%)                        13.7   7.67  )-----------( 134      ( 18 Nov 2020 06:30 )             41.2     11-18    143  |  109<H>  |  9   ----------------------------<  88  4.6   |  29  |  0.92    Ca    9.0      18 Nov 2020 06:30  Phos  3.6     11-18  Mg     2.4     11-18    TPro  7.2  /  Alb  4.1  /  TBili  0.7  /  DBili  x   /  AST  16  /  ALT  37  /  AlkPhos  58  11-17        CAPILLARY BLOOD GLUCOSE          MEDICATIONS  (STANDING):  aspirin enteric coated 81 milliGRAM(s) Oral daily  atorvastatin 40 milliGRAM(s) Oral at bedtime  ceFAZolin   IVPB 1000 milliGRAM(s) IV Intermittent every 8 hours  enoxaparin Injectable 40 milliGRAM(s) SubCutaneous daily  lidocaine   Patch 1 Patch Transdermal daily  meclizine 25 milliGRAM(s) Oral three times a day  pantoprazole    Tablet 40 milliGRAM(s) Oral before breakfast  sodium chloride 0.9%. 1000 milliLiter(s) (125 mL/Hr) IV Continuous <Continuous>    MEDICATIONS  (PRN):  acetaminophen   Tablet .. 650 milliGRAM(s) Oral every 6 hours PRN Mild Pain (1 - 3)  ketorolac   Injectable 30 milliGRAM(s) IV Push every 8 hours PRN Severe Pain (7 - 10)  traMADol 25 milliGRAM(s) Oral every 8 hours PRN Moderate Pain (4 - 6)    Allergies    No Known Allergies    Intolerances      PAST MEDICAL & SURGICAL HISTORY:  GERD (gastroesophageal reflux disease)    S/P appendectomy      Social History:  Alcohol: occasional   Smoking: vape pen +   Illicit drugs: Denied  Previous Profession: (17 Nov 2020 23:14)  < from: US Duplex Venous Lower Ext Complete, Bilateral (11.18.20 @ 16:18) >    EXAM:  US DPLX LWR EXT VEINS COMPL BI                            PROCEDURE DATE:  11/18/2020          INTERPRETATION:  CLINICAL INFORMATION: Acute ischemic heart disease, evaluate DVT, left foot pain x3 days    COMPARISON: None available.    TECHNIQUE: Duplex sonography of the BILATERAL LOWER extremity veins with color and spectral Doppler, with and without compression.    FINDINGS:    There is normal compressibility of the bilateral common femoral, femoral and popliteal veins.  Doppler examination shows normal spontaneous and phasic flow.    No calf vein thrombosis is detected.    IMPRESSION:  No evidence of deep venous thrombosis in either lower extremity.                BECKY MEREDITH MD; Attending Radiologist  This document has been electronically signed. Nov 18 2020  4:20PM    < end of copied text >  < from: Xray Foot AP + Lateral + Oblique, Left (11.17.20 @ 19:21) >    EXAM:  FOOT LEFT (MINIMUM 3 VIEWS)                            PROCEDURE DATE:  11/17/2020          INTERPRETATION:  CLINICAL INDICATION: 40 years Male with Lt lat foot pain.    COMPARISON: None    AP, lateral and oblique radiographs of the left foot were obtained.    There is no fracture, subluxation or dislocation. No lytic or blastic lesions are identified.    There is no periosteal reaction or cortical disruption to suggest osteomyelitis.    The osseous mineralization is normal.    No soft tissue abnormality is seen.    IMPRESSION:    Normal Study              PAUL GONZÁLES MD; Attending Radiologist  This document has been electronically signed. Nov 18 2020  8:42AM    < end of copied text >  pt seen and evaluated in ED   AAOx3      reports pain in his left foot since Friday night / Saturday morning    denies any trauma , describes pain as sharp   recalls history of bone fractures in the same foot few years ago ?   exam lower extremities:   vasc:   DP/PT -palpable   TG -WNL  CRF-WNL  Neuro:   sensation grossly intact   Derm:   no open skin no drainage   pedal hair growth -WNL   MS:   pain tenderness left foot cuboid   met 3, 4, 5   small area of induration palpable under skin   small area of skin erythema on the dorsum of the foot   as per pt improved since yesterday

## 2020-11-18 NOTE — PROGRESS NOTE ADULT - PROBLEM SELECTOR PLAN 1
left foot xray result as above.  afebrile, no leukocytosis  c/w Cefazolin , Day 1-2  pain control   PT

## 2020-11-18 NOTE — PROGRESS NOTE ADULT - SUBJECTIVE AND OBJECTIVE BOX
Time of Visit:  Patient seen and examined.     MEDICATIONS  (STANDING):  aspirin enteric coated 81 milliGRAM(s) Oral daily  atorvastatin 40 milliGRAM(s) Oral at bedtime  ceFAZolin   IVPB 1000 milliGRAM(s) IV Intermittent every 8 hours  enoxaparin Injectable 40 milliGRAM(s) SubCutaneous daily  lidocaine   Patch 1 Patch Transdermal daily  meclizine 25 milliGRAM(s) Oral three times a day  pantoprazole    Tablet 40 milliGRAM(s) Oral before breakfast  sodium chloride 0.9%. 1000 milliLiter(s) (125 mL/Hr) IV Continuous <Continuous>      MEDICATIONS  (PRN):  acetaminophen   Tablet .. 650 milliGRAM(s) Oral every 6 hours PRN Mild Pain (1 - 3)  traMADol 25 milliGRAM(s) Oral every 8 hours PRN Moderate Pain (4 - 6)       Medications up to date at time of exam.      PHYSICAL EXAMINATION:  Patient has no new complaints.  GENERAL: The patient is a well-developed, well-nourished, in no apparent distress.     Vital Signs Last 24 Hrs  T(C): 36.2 (18 Nov 2020 12:21), Max: 36.8 (17 Nov 2020 22:13)  T(F): 97.2 (18 Nov 2020 12:21), Max: 98.2 (17 Nov 2020 22:13)  HR: 54 (18 Nov 2020 12:21) (52 - 89)  BP: 101/54 (18 Nov 2020 12:21) (101/54 - 118/80)  BP(mean): --  RR: 18 (18 Nov 2020 12:21) (16 - 18)  SpO2: 98% (18 Nov 2020 12:21) (97% - 100%)   (if applicable)    Chest Tube (if applicable)    HEENT: Head is normocephalic and atraumatic. Extraocular muscles are intact. Mucous membranes are moist.     NECK: Supple, no palpable adenopathy.    LUNGS: Clear to auscultation, no wheezing, rales, or rhonchi.    HEART: Regular rate and rhythm without murmur.    ABDOMEN: Soft, nontender, and nondistended.  No hepatosplenomegaly is noted.    : No painful voiding, no pelvic pain    EXTREMITIES: Without any cyanosis, clubbing, rash, lesions or edema.    NEUROLOGIC: Awake, alert, oriented, grossly intact    SKIN: Warm, dry, good turgor.      LABS:                        13.7   7.67  )-----------( 134      ( 18 Nov 2020 06:30 )             41.2     11-18    143  |  109<H>  |  9   ----------------------------<  88  4.6   |  29  |  0.92    Ca    9.0      18 Nov 2020 06:30  Phos  3.6     11-18  Mg     2.4     11-18    TPro  7.2  /  Alb  4.1  /  TBili  0.7  /  DBili  x   /  AST  16  /  ALT  37  /  AlkPhos  58  11-17          CARDIAC MARKERS ( 18 Nov 2020 06:30 )  <0.015 ng/mL / x     / x     / x     / x      CARDIAC MARKERS ( 17 Nov 2020 18:48 )  <0.015 ng/mL / x     / 71 U/L / x     / x              Lactate, Blood: 1.1 mmol/L (11-17-20 @ 21:31)        MICROBIOLOGY: (if applicable)    RADIOLOGY & ADDITIONAL STUDIES:  EKG:   CXR:  ECHO:    IMPRESSION: 40y Male PAST MEDICAL & SURGICAL HISTORY:  GERD (gastroesophageal reflux disease)    S/P appendectomy     p/w       IMP:  This is 40 yr old man  with  GERD pw multiple complaints and left sided chest pain associated with chest palpitations x2 weeks. Patient states its associated with radiation to left arm. Pt endorses feeling dizzy for 2 weeks, positional vertigo when turns head. Pt also states main reason for coming to hospital was left foot pain which started 5 days ago. Pt denies any trauma or exacerbating factors. pain is sharp and not improved with anything.  Pt has no n/v, fever, no recent cold, ear infection. Pat stated his chest pain/ palpitation with SOB occurred after smoking marijuana . SOB is due to palpitation       Sugg:  -advise to stop marijuana use  -admit to tele  -ACS work up  -ASA. Statin  -podiatry eval  -doppler of b/l lower ext  -cards eval noted .. pat refusing chemical stress , cannot walk due to left foot pain

## 2020-11-18 NOTE — CONSULT NOTE ADULT - ASSESSMENT
A:  left foot pain   cellulitis /vs gout /vs stress fracture /cuboid syndrome   p:   pt seen and evaluated   rec : modified quarles dressing   limited weight bearing to left foot -CAM walker   f/u uric  acid   rec : Ct scan   please contact with any questions [phone]    A:  left foot pain   cellulitis /vs gout /vs stress fracture /cuboid syndrome   p:   pt seen and evaluated   rec : modified quarles dressing   limited weight bearing to left foot -CAM walker   f/u uric  acid   rec : MRI    please contact with any questions [phone]

## 2020-11-18 NOTE — PROGRESS NOTE ADULT - SUBJECTIVE AND OBJECTIVE BOX
NP Note discussed with  Primary Attending    Patient is a 40y old  Male who presents with a chief complaint of chest pain (18 Nov 2020 15:09)    HPI  41 yo male with PMH of  GERD  who presented  multiple complaints and left sided chest pain associated with chest palpitations x2 weeks. Patient states its associated with radiation to left arm. Pt endorses feeling dizzy for 2 weeks, positional vertigo when turns head. Pt also states main reason for coming to hospital was left foot pain which started 5 days PTA. Pt denies any trauma or exacerbating factors. pain is sharp and not improved with anything.  Pt has no n/v, fever, no recent cold, ear infection, SOB, coughing, COVID exposure. Pt admitted to Tele. Trop negative x 2, EKG shows SB. Antibiotic  initiated for left foot cellulitis, currently on Cefazolin Day 2.         INTERVAL HPI/OVERNIGHT EVENTS: no new complaints    MEDICATIONS  (STANDING):  aspirin enteric coated 81 milliGRAM(s) Oral daily  atorvastatin 40 milliGRAM(s) Oral at bedtime  ceFAZolin   IVPB 1000 milliGRAM(s) IV Intermittent every 8 hours  enoxaparin Injectable 40 milliGRAM(s) SubCutaneous daily  lidocaine   Patch 1 Patch Transdermal daily  meclizine 25 milliGRAM(s) Oral three times a day  pantoprazole    Tablet 40 milliGRAM(s) Oral before breakfast  sodium chloride 0.9%. 1000 milliLiter(s) (125 mL/Hr) IV Continuous <Continuous>    MEDICATIONS  (PRN):  acetaminophen   Tablet .. 650 milliGRAM(s) Oral every 6 hours PRN Mild Pain (1 - 3)  traMADol 25 milliGRAM(s) Oral every 8 hours PRN Moderate Pain (4 - 6)      __________________________________________________  REVIEW OF SYSTEMS:    CONSTITUTIONAL: No fever,   EYES: no acute visual disturbances  NECK: No pain or stiffness  RESPIRATORY: No cough; No shortness of breath  CARDIOVASCULAR: No chest pain, no palpitations  GASTROINTESTINAL: No pain. No nausea or vomiting; No diarrhea   NEUROLOGICAL: No headache or numbness, no tremors  MUSCULOSKELETAL: No joint pain, no muscle pain  GENITOURINARY: no dysuria, no frequency, no hesitancy  PSYCHIATRY: no depression , no anxiety  ALL OTHER  ROS negative        Vital Signs Last 24 Hrs  T(C): 36.2 (18 Nov 2020 12:21), Max: 36.8 (17 Nov 2020 22:13)  T(F): 97.2 (18 Nov 2020 12:21), Max: 98.2 (17 Nov 2020 22:13)  HR: 54 (18 Nov 2020 12:21) (52 - 89)  BP: 101/54 (18 Nov 2020 12:21) (101/54 - 118/80)  BP(mean): --  RR: 18 (18 Nov 2020 12:21) (16 - 18)  SpO2: 98% (18 Nov 2020 12:21) (97% - 100%)    ________________________________________________  PHYSICAL EXAM:  GENERAL: NAD  HEENT: Normocephalic;  conjunctivae and sclerae clear; moist mucous membranes;   NECK : supple  CHEST/LUNG: Clear to auscultation bilaterally with good air entry   HEART: S1 S2  regular; no murmurs, gallops or rubs  ABDOMEN: Soft, Nontender, Nondistended; Bowel sounds present  EXTREMITIES: no cyanosis; no edema; no calf tenderness  SKIN: warm and dry; no rash  NERVOUS SYSTEM:  Awake and alert; Oriented  to place, person and time ; no new deficits    _________________________________________________  LABS:                        13.7   7.67  )-----------( 134      ( 18 Nov 2020 06:30 )             41.2     11-18    143  |  109<H>  |  9   ----------------------------<  88  4.6   |  29  |  0.92    Ca    9.0      18 Nov 2020 06:30  Phos  3.6     11-18  Mg     2.4     11-18    TPro  7.2  /  Alb  4.1  /  TBili  0.7  /  DBili  x   /  AST  16  /  ALT  37  /  AlkPhos  58  11-17        CAPILLARY BLOOD GLUCOSE            RADIOLOGY & ADDITIONAL TESTS:    Imaging Personally Reviewed:  YES/NO    Consultant(s) Notes Reviewed:   YES/ No    Care Discussed with Consultants :     Plan of care was discussed with patient and /or primary care giver; all questions and concerns were addressed and care was aligned with patient's wishes.     NP Note discussed with  Primary Attending    Patient is a 40y old  Male who presents with a chief complaint of chest pain (18 Nov 2020 15:09)    HPI  41 yo male with PMH of  GERD  who presented  multiple complaints and left sided chest pain associated with chest palpitations x2 weeks. Patient states its associated with radiation to left arm. Pt endorses feeling dizzy for 2 weeks, positional vertigo when turns head. Pt also states main reason for coming to hospital was left foot pain which started 5 days PTA. Pt denies any trauma or exacerbating factors. pain is sharp and not improved with anything.  Pt has no n/v, fever, no recent cold, ear infection, SOB, coughing, COVID exposure. Pt admitted to Tele. Trop negative x 2, EKG shows SB. Antibiotic  initiated for left foot cellulitis, currently on Cefazolin Day 2.         INTERVAL HPI/OVERNIGHT EVENTS: Pt seen and examined this morning. Pt reports acute pain in left foot . Pt denies SOB and chest pain, and refuses stress test since he cannot bear weight on his left foot and he does not want any "drugs (injected) into him."     MEDICATIONS  (STANDING):  aspirin enteric coated 81 milliGRAM(s) Oral daily  atorvastatin 40 milliGRAM(s) Oral at bedtime  ceFAZolin   IVPB 1000 milliGRAM(s) IV Intermittent every 8 hours  enoxaparin Injectable 40 milliGRAM(s) SubCutaneous daily  lidocaine   Patch 1 Patch Transdermal daily  meclizine 25 milliGRAM(s) Oral three times a day  pantoprazole    Tablet 40 milliGRAM(s) Oral before breakfast  sodium chloride 0.9%. 1000 milliLiter(s) (125 mL/Hr) IV Continuous <Continuous>    MEDICATIONS  (PRN):  acetaminophen   Tablet .. 650 milliGRAM(s) Oral every 6 hours PRN Mild Pain (1 - 3)  traMADol 25 milliGRAM(s) Oral every 8 hours PRN Moderate Pain (4 - 6)      __________________________________________________  REVIEW OF SYSTEMS:    CONSTITUTIONAL: No fever,   EYES: no acute visual disturbances  NECK: No pain or stiffness  RESPIRATORY: No cough; No shortness of breath  CARDIOVASCULAR: No chest pain, no palpitations  GASTROINTESTINAL: No pain. No nausea or vomiting; No diarrhea   NEUROLOGICAL: No headache or numbness, no tremors  MUSCULOSKELETAL: Severe Left foot pain   GENITOURINARY: no dysuria, no frequency, no hesitancy  PSYCHIATRY: no depression , no anxiety  ALL OTHER  ROS negative        Vital Signs Last 24 Hrs  T(C): 36.2 (18 Nov 2020 12:21), Max: 36.8 (17 Nov 2020 22:13)  T(F): 97.2 (18 Nov 2020 12:21), Max: 98.2 (17 Nov 2020 22:13)  HR: 54 (18 Nov 2020 12:21) (52 - 89)  BP: 101/54 (18 Nov 2020 12:21) (101/54 - 118/80)  BP(mean): --  RR: 18 (18 Nov 2020 12:21) (16 - 18)  SpO2: 98% (18 Nov 2020 12:21) (97% - 100%)    ________________________________________________  PHYSICAL EXAM:  GENERAL: NAD  HEENT: Normocephalic;  conjunctivae and sclerae clear; moist mucous membranes;   NECK : supple  CHEST/LUNG: Clear to auscultation bilaterally with good air entry   HEART: S1 S2  regular; no murmurs, gallops or rubs  ABDOMEN: Soft, Nontender, Nondistended; Bowel sounds present  EXTREMITIES: No erythema left dorsal foot, no cyanosis; no edema; no calf tenderness  SKIN: warm and dry; no rash  NERVOUS SYSTEM:  Awake and alert; Oriented  to place, person and time ; no new deficits    _________________________________________________  LABS:                        13.7   7.67  )-----------( 134      ( 18 Nov 2020 06:30 )             41.2     11-18    143  |  109<H>  |  9   ----------------------------<  88  4.6   |  29  |  0.92    Ca    9.0      18 Nov 2020 06:30  Phos  3.6     11-18  Mg     2.4     11-18    TPro  7.2  /  Alb  4.1  /  TBili  0.7  /  DBili  x   /  AST  16  /  ALT  37  /  AlkPhos  58  11-17        CAPILLARY BLOOD GLUCOSE            RADIOLOGY & ADDITIONAL TESTS:  < from: Xray Foot AP + Lateral + Oblique, Left (11.17.20 @ 19:21) >  AP, lateral and oblique radiographs of the left foot were obtained.    There is no fracture, subluxation or dislocation. No lytic or blastic lesions are identified.    There is no periosteal reaction or cortical disruption to suggest osteomyelitis.    The osseous mineralization is normal.    No soft tissue abnormality is seen.    IMPRESSION:    Normal Study    < end of copied text >  < from: Xray Chest 2 Views PA/Lat (11.17.20 @ 19:20) >    Heart and mediastinum normal.  Lungs clear.  Costophrenic angles sharp bilaterally.    IMPRESSION: Normal chest    < end of copied text >      Consultant(s) Notes Reviewed:   YES/ No    Care Discussed with Consultants :     Plan of care was discussed with patient and /or primary care giver; all questions and concerns were addressed and care was aligned with patient's wishes.

## 2020-11-18 NOTE — CONSULT NOTE ADULT - SUBJECTIVE AND OBJECTIVE BOX
HPI:  39 yo male with hx of GERD pw multiple complaints and left sided chest pain associated with chest palpitations x2 weeks. Patient states its associated with radiation to left arm. Pt endorses feeling dizzy for 2 weeks, positional vertigo when turns head. Pt also states main reason for coming to hospital was left foot pain which started 5 days ago. Pt denies any trauma or exacerbating factors. pain is sharp and not improved with anything.  Pt has no n/v, fever, no recent cold, ear infection, SOB, coughing, COVID exposure.    (17 Nov 2020 23:14)    Interval History: No significant event overnight, Patient endorses feeling better    MEDICATIONS  (STANDING):  aspirin enteric coated 81 milliGRAM(s) Oral daily  atorvastatin 40 milliGRAM(s) Oral at bedtime  ceFAZolin   IVPB 1000 milliGRAM(s) IV Intermittent every 8 hours  enoxaparin Injectable 40 milliGRAM(s) SubCutaneous daily  lidocaine   Patch 1 Patch Transdermal daily  meclizine 25 milliGRAM(s) Oral three times a day  pantoprazole    Tablet 40 milliGRAM(s) Oral before breakfast  sodium chloride 0.9%. 1000 milliLiter(s) (125 mL/Hr) IV Continuous <Continuous>    MEDICATIONS  (PRN):  acetaminophen   Tablet .. 650 milliGRAM(s) Oral every 6 hours PRN Mild Pain (1 - 3)  traMADol 25 milliGRAM(s) Oral every 8 hours PRN Moderate Pain (4 - 6)    PAST MEDICAL & SURGICAL HISTORY:  GERD (gastroesophageal reflux disease)    S/P appendectomy      SOCIAL HISTORY:  Smoker:  YES / NO        PACK YEARS:                         WHEN QUIT?  ETOH use:  YES / NO               FREQUENCY / QUANTITY:  Ilicit Drug use:  YES / NO      REVIEW OF SYSTEMS:    CONSTITUTIONAL: No weakness, fevers or chills  RESPIRATORY: No cough, wheezing, hemoptysis; No shortness of breath  CARDIOVASCULAR: Left sided chest pain with palpitations  GASTROINTESTINAL: No abdominal or epigastric pain. No nausea, vomiting, or hematemesis; No diarrhea or constipation. No melena or hematochezia.  NEUROLOGICAL: No numbness or weakness  All other review of systems is negative unless indicated above.    Vital Signs Last 24 Hrs  T(C): 36.6 (18 Nov 2020 07:15), Max: 36.8 (17 Nov 2020 22:13)  T(F): 97.9 (18 Nov 2020 07:15), Max: 98.2 (17 Nov 2020 22:13)  HR: 53 (18 Nov 2020 07:15) (52 - 89)  BP: 107/55 (18 Nov 2020 07:15) (105/65 - 118/80)  BP(mean): --  RR: 18 (18 Nov 2020 07:15) (16 - 18)  SpO2: 100% (18 Nov 2020 07:15) (97% - 100%)  General: A/ox 3, No acute Distress  Neck: Supple, NO JVD  Cardiac: S1 S2, No M/R/G  Pulmonary: CTAB, Breathing unlabored, No Rhonchi/Rales/Wheezing  Abdomen: Soft, Non -tender, +BS x 4 quads  Extremities: No Rashes, No edema  Neuro: A/o x 3, No focal deficits        Telemetry:  EKG:  CHADSVASC:                          13.7   7.67  )-----------( 134      ( 18 Nov 2020 06:30 )             41.2     11-18    143  |  109<H>  |  9   ----------------------------<  88  4.6   |  29  |  0.92    Ca    9.0      18 Nov 2020 06:30  Phos  3.6     11-18  Mg     2.4     11-18    TPro  7.2  /  Alb  4.1  /  TBili  0.7  /  DBili  x   /  AST  16  /  ALT  37  /  AlkPhos  58  11-17      Assessment/Plan    1)

## 2020-11-18 NOTE — CONSULT NOTE ADULT - PROBLEM SELECTOR RECOMMENDATION 2
Patient reports palpitations with chest pain,   H/O father having 'palpitations ' and getting a procedure for ablation   EKG shows sinus Arturo, No events on Tele  TSH, T3, T4 normal  CHADsVASc 0

## 2020-11-18 NOTE — CONSULT NOTE ADULT - PROBLEM SELECTOR RECOMMENDATION 9
Pt pw left sided chest pain associated with palpitations   No diaphoresis, Localized pain, No aggravating or relieving factors  Troponin negative x2,  Continue Aspirin and Statin for now, Hold b blockers or ACEI/ARBs as HR and BP is on lower side  EKG sinus bradycardia   Continue Tele, No significant event  low Heart and MEHREEN scores   Patient refusing Nuclear Stress Test as he does not want medication and Cannot perform exercise stress test  Management of GERD Pt pw left sided chest pain associated with palpitations   No diaphoresis, Localized pain, No aggravating or relieving factors  Troponin negative x2,  Continue Aspirin and Statin for now, Hold b blockers or ACEI/ARBs as HR and BP is on lower side  EKG sinus bradycardia   Continue Tele, No significant event  low Heart and MEHREEN scores   Patient refusing Nuclear Stress Test as he does not want medication and Cannot perform exercise stress test,  Management of GERD as chest pain is likely due to GI reasons  Stress test can be done outpatient if patient does not agree,

## 2020-11-19 DIAGNOSIS — M79.672 PAIN IN LEFT FOOT: ICD-10-CM

## 2020-11-19 LAB
ANION GAP SERPL CALC-SCNC: 9 MMOL/L — SIGNIFICANT CHANGE UP (ref 5–17)
BUN SERPL-MCNC: 16 MG/DL — SIGNIFICANT CHANGE UP (ref 7–18)
CALCIUM SERPL-MCNC: 9 MG/DL — SIGNIFICANT CHANGE UP (ref 8.4–10.5)
CHLORIDE SERPL-SCNC: 106 MMOL/L — SIGNIFICANT CHANGE UP (ref 96–108)
CO2 SERPL-SCNC: 24 MMOL/L — SIGNIFICANT CHANGE UP (ref 22–31)
CREAT SERPL-MCNC: 1.01 MG/DL — SIGNIFICANT CHANGE UP (ref 0.5–1.3)
GLUCOSE SERPL-MCNC: 89 MG/DL — SIGNIFICANT CHANGE UP (ref 70–99)
HCT VFR BLD CALC: 38.7 % — LOW (ref 39–50)
HGB BLD-MCNC: 12.8 G/DL — LOW (ref 13–17)
MCHC RBC-ENTMCNC: 30.5 PG — SIGNIFICANT CHANGE UP (ref 27–34)
MCHC RBC-ENTMCNC: 33.1 GM/DL — SIGNIFICANT CHANGE UP (ref 32–36)
MCV RBC AUTO: 92.1 FL — SIGNIFICANT CHANGE UP (ref 80–100)
NRBC # BLD: 0 /100 WBCS — SIGNIFICANT CHANGE UP (ref 0–0)
PLATELET # BLD AUTO: 118 K/UL — LOW (ref 150–400)
POTASSIUM SERPL-MCNC: 4.4 MMOL/L — SIGNIFICANT CHANGE UP (ref 3.5–5.3)
POTASSIUM SERPL-SCNC: 4.4 MMOL/L — SIGNIFICANT CHANGE UP (ref 3.5–5.3)
RBC # BLD: 4.2 M/UL — SIGNIFICANT CHANGE UP (ref 4.2–5.8)
RBC # FLD: 11.8 % — SIGNIFICANT CHANGE UP (ref 10.3–14.5)
SODIUM SERPL-SCNC: 139 MMOL/L — SIGNIFICANT CHANGE UP (ref 135–145)
URATE SERPL-MCNC: 6.6 MG/DL — SIGNIFICANT CHANGE UP (ref 3.4–8.8)
WBC # BLD: 7.46 K/UL — SIGNIFICANT CHANGE UP (ref 3.8–10.5)
WBC # FLD AUTO: 7.46 K/UL — SIGNIFICANT CHANGE UP (ref 3.8–10.5)

## 2020-11-19 PROCEDURE — 93306 TTE W/DOPPLER COMPLETE: CPT | Mod: 26

## 2020-11-19 PROCEDURE — 73718 MRI LOWER EXTREMITY W/O DYE: CPT | Mod: 26,LT

## 2020-11-19 RX ORDER — TRAMADOL HYDROCHLORIDE 50 MG/1
25 TABLET ORAL EVERY 12 HOURS
Refills: 0 | Status: DISCONTINUED | OUTPATIENT
Start: 2020-11-19 | End: 2020-11-20

## 2020-11-19 RX ORDER — IBUPROFEN 200 MG
400 TABLET ORAL EVERY 8 HOURS
Refills: 0 | Status: DISCONTINUED | OUTPATIENT
Start: 2020-11-19 | End: 2020-11-20

## 2020-11-19 RX ADMIN — TRAMADOL HYDROCHLORIDE 25 MILLIGRAM(S): 50 TABLET ORAL at 01:30

## 2020-11-19 RX ADMIN — Medication 400 MILLIGRAM(S): at 18:00

## 2020-11-19 RX ADMIN — SODIUM CHLORIDE 125 MILLILITER(S): 9 INJECTION INTRAMUSCULAR; INTRAVENOUS; SUBCUTANEOUS at 20:15

## 2020-11-19 RX ADMIN — ATORVASTATIN CALCIUM 40 MILLIGRAM(S): 80 TABLET, FILM COATED ORAL at 21:56

## 2020-11-19 RX ADMIN — TRAMADOL HYDROCHLORIDE 25 MILLIGRAM(S): 50 TABLET ORAL at 23:14

## 2020-11-19 RX ADMIN — PANTOPRAZOLE SODIUM 40 MILLIGRAM(S): 20 TABLET, DELAYED RELEASE ORAL at 06:07

## 2020-11-19 RX ADMIN — Medication 25 MILLIGRAM(S): at 21:56

## 2020-11-19 RX ADMIN — Medication 30 MILLIGRAM(S): at 02:34

## 2020-11-19 RX ADMIN — Medication 25 MILLIGRAM(S): at 06:06

## 2020-11-19 RX ADMIN — Medication 81 MILLIGRAM(S): at 13:15

## 2020-11-19 RX ADMIN — Medication 400 MILLIGRAM(S): at 17:06

## 2020-11-19 RX ADMIN — TRAMADOL HYDROCHLORIDE 25 MILLIGRAM(S): 50 TABLET ORAL at 02:34

## 2020-11-19 RX ADMIN — TRAMADOL HYDROCHLORIDE 25 MILLIGRAM(S): 50 TABLET ORAL at 10:05

## 2020-11-19 RX ADMIN — TRAMADOL HYDROCHLORIDE 25 MILLIGRAM(S): 50 TABLET ORAL at 22:12

## 2020-11-19 RX ADMIN — Medication 100 MILLIGRAM(S): at 13:17

## 2020-11-19 RX ADMIN — Medication 100 MILLIGRAM(S): at 21:55

## 2020-11-19 RX ADMIN — Medication 100 MILLIGRAM(S): at 06:07

## 2020-11-19 RX ADMIN — Medication 30 MILLIGRAM(S): at 01:24

## 2020-11-19 RX ADMIN — SODIUM CHLORIDE 125 MILLILITER(S): 9 INJECTION INTRAMUSCULAR; INTRAVENOUS; SUBCUTANEOUS at 17:08

## 2020-11-19 RX ADMIN — TRAMADOL HYDROCHLORIDE 25 MILLIGRAM(S): 50 TABLET ORAL at 11:00

## 2020-11-19 RX ADMIN — Medication 25 MILLIGRAM(S): at 13:17

## 2020-11-19 NOTE — PROGRESS NOTE ADULT - SUBJECTIVE AND OBJECTIVE BOX
PGY-1 Progress Note discussed with attending  PAGER #: [[phone]] TILL 5:00 PM  PLEASE CONTACT ON CALL TEAM:  - On Call Team (Please refer to Luis) FROM 5:00 PM - 8:30PM  - Nightfloat Team FROM 8:30 -7:30 AM    CHIEF COMPLAINT & BRIEF HOSPITAL COURSE:  39 yo male with past medical hx of GERD,  multiple complaints and left sided chest pain associated with chest palpitations x2 weeks. Patient states its associated with radiation to left arm. Pt endorses feeling dizzy for 2 weeks, positional vertigo when turns head. Pt also states main reason for coming to hospital was left foot pain which started 5 days ago. Pt denies any trauma or exacerbating factors. pain is sharp and not improved with anything.  Pt has no n/v, fever, no recent cold, ear infection, SOB, coughing, COVID exposure.    Trop x 3 negative   Folate normal, vit B12 normal, A1c 4.8   **  Bcx prelim -ve  US doppler BL LE -ve for DVT  CXR unremarkable  Left foot x ray unremarkable      INTERVAL HPI/OVERNIGHT EVENTS:   Patient refers he is feeling fine, denies any chest pain, palpitations, dizziness or sob.       MEDICATIONS  (STANDING):  aspirin enteric coated 81 milliGRAM(s) Oral daily  atorvastatin 40 milliGRAM(s) Oral at bedtime  ceFAZolin   IVPB 1000 milliGRAM(s) IV Intermittent every 8 hours  enoxaparin Injectable 40 milliGRAM(s) SubCutaneous daily  lidocaine   Patch 1 Patch Transdermal daily  meclizine 25 milliGRAM(s) Oral three times a day  pantoprazole    Tablet 40 milliGRAM(s) Oral before breakfast  sodium chloride 0.9%. 1000 milliLiter(s) (125 mL/Hr) IV Continuous <Continuous>    MEDICATIONS  (PRN):  acetaminophen   Tablet .. 650 milliGRAM(s) Oral every 6 hours PRN Mild Pain (1 - 3)  traMADol 25 milliGRAM(s) Oral every 8 hours PRN Moderate Pain (4 - 6)      Vital Signs Last 24 Hrs  T(C): 36.7 (19 Nov 2020 11:51), Max: 36.7 (18 Nov 2020 16:51)  T(F): 98 (19 Nov 2020 11:51), Max: 98.1 (18 Nov 2020 16:51)  HR: 54 (19 Nov 2020 11:51) (48 - 55)  BP: 105/60 (19 Nov 2020 11:51) (95/60 - 109/59)  BP(mean): --  RR: 18 (19 Nov 2020 11:51) (18 - 19)  SpO2: 99% (19 Nov 2020 11:51) (97% - 100%)    PHYSICAL EXAMINATION:  GENERAL: NAD, well built  HEAD:  Atraumatic, Normocephalic  EYES:  conjunctiva and sclera clear  NECK: Supple, No JVD, Normal thyroid  CHEST/LUNG: Clear to auscultation. Clear to percussion bilaterally; No rales, rhonchi, wheezing, or rubs  HEART: Regular rate and rhythm; No murmurs, rubs, or gallops  ABDOMEN: Soft, Nontender, Nondistended; Bowel sounds present  NERVOUS SYSTEM:  Alert & Oriented X3,    EXTREMITIES:  2+ Peripheral Pulses, No clubbing, cyanosis, or edema  SKIN: warm dry                          12.8   7.46  )-----------( 118      ( 19 Nov 2020 06:31 )             38.7     11-19    139  |  106  |  16  ----------------------------<  89  4.4   |  24  |  1.01    Ca    9.0      19 Nov 2020 06:31  Phos  3.6     11-18  Mg     2.4     11-18    TPro  7.2  /  Alb  4.1  /  TBili  0.7  /  DBili  x   /  AST  16  /  ALT  37  /  AlkPhos  58  11-17    LIVER FUNCTIONS - ( 17 Nov 2020 18:48 )  Alb: 4.1 g/dL / Pro: 7.2 g/dL / ALK PHOS: 58 U/L / ALT: 37 U/L DA / AST: 16 U/L / GGT: x           CARDIAC MARKERS ( 18 Nov 2020 14:34 )  <0.015 ng/mL / x     / x     / x     / x      CARDIAC MARKERS ( 18 Nov 2020 06:30 )  <0.015 ng/mL / x     / x     / x     / x      CARDIAC MARKERS ( 17 Nov 2020 18:48 )  <0.015 ng/mL / x     / 71 U/L / x     / x        I&O's Summary        Culture - Blood (collected 18 Nov 2020 03:20)  Source: .Blood Blood  Preliminary Report (19 Nov 2020 04:01):    No growth to date.    Culture - Blood (collected 18 Nov 2020 03:18)  Source: .Blood Blood  Preliminary Report (19 Nov 2020 04:01):    No growth to date.                         PGY-1 Progress Note discussed with attending  PAGER #: [[phone]] TILL 5:00 PM  PLEASE CONTACT ON CALL TEAM:  - On Call Team (Please refer to Luis) FROM 5:00 PM - 8:30PM  - Nightfloat Team FROM 8:30 -7:30 AM    CHIEF COMPLAINT & BRIEF HOSPITAL COURSE:  41 yo male with past medical hx of GERD, came to ED c/o left sided chest pain associated to palpitations x 2 weeks. Also referred feeling dizzy for 2 weeks and left foot pain x 5 days. Pt denies any trauma or exacerbating factors. On admission, EKG showed Increased R/S ratio in V1, consider early transition or posterior infarct. Trop x 3 negative, no chest pain or palpitations, US doppler BL LE -ve for DVT, CXR was unremarkable, , Folate normal, vit B12 normal, A1c 4.8, uric acid 6.6 normal, Bcx prelim -ve, no leucocytosis.   Left foot pain could be secondary to cellulitis vs gout vs stress fracture Patient was started on Cefazolin, Left foot x ray unremarkable, awaiting for MRI of left foot to r/o osteomyelitis. Remains with clean dressing as per podiatry.     INTERVAL HPI/OVERNIGHT EVENTS:   Patient refers he is feeling fine, denies any chest pain, palpitations, dizziness or sob. Patient referred pain on his left foot, on pain medication regimen.      MEDICATIONS  (STANDING):  aspirin enteric coated 81 milliGRAM(s) Oral daily  atorvastatin 40 milliGRAM(s) Oral at bedtime  ceFAZolin   IVPB 1000 milliGRAM(s) IV Intermittent every 8 hours  enoxaparin Injectable 40 milliGRAM(s) SubCutaneous daily  lidocaine   Patch 1 Patch Transdermal daily  meclizine 25 milliGRAM(s) Oral three times a day  pantoprazole    Tablet 40 milliGRAM(s) Oral before breakfast  sodium chloride 0.9%. 1000 milliLiter(s) (125 mL/Hr) IV Continuous <Continuous>    MEDICATIONS  (PRN):  acetaminophen   Tablet .. 650 milliGRAM(s) Oral every 6 hours PRN Mild Pain (1 - 3)  traMADol 25 milliGRAM(s) Oral every 8 hours PRN Moderate Pain (4 - 6)      Vital Signs Last 24 Hrs  T(C): 36.7 (19 Nov 2020 11:51), Max: 36.7 (18 Nov 2020 16:51)  T(F): 98 (19 Nov 2020 11:51), Max: 98.1 (18 Nov 2020 16:51)  HR: 54 (19 Nov 2020 11:51) (48 - 55)  BP: 105/60 (19 Nov 2020 11:51) (95/60 - 109/59)  BP(mean): --  RR: 18 (19 Nov 2020 11:51) (18 - 19)  SpO2: 99% (19 Nov 2020 11:51) (97% - 100%)    PHYSICAL EXAMINATION:  GENERAL: NAD, average  HEAD:  Atraumatic, Normocephalic  EYES:  conjunctiva and sclera clear  NECK: Supple, No JVD, Normal thyroid  CHEST/LUNG: Clear to auscultation. Clear to percussion bilaterally; No rales, rhonchi, wheezing, or rubs  HEART: Bradycardic; No murmurs, rubs, or gallops  ABDOMEN: Soft, Nontender, Nondistended; Bowel sounds present, no pain or masses on palpation   NERVOUS SYSTEM:  Alert & Oriented X3, calmed   EXTREMITIES:  2+ Peripheral Pulses, No clubbing, cyanosis, or edema. Left foot with clean dressing   SKIN: warm dry                          12.8   7.46  )-----------( 118      ( 19 Nov 2020 06:31 )             38.7     11-19    139  |  106  |  16  ----------------------------<  89  4.4   |  24  |  1.01    Ca    9.0      19 Nov 2020 06:31  Phos  3.6     11-18  Mg     2.4     11-18    TPro  7.2  /  Alb  4.1  /  TBili  0.7  /  DBili  x   /  AST  16  /  ALT  37  /  AlkPhos  58  11-17    LIVER FUNCTIONS - ( 17 Nov 2020 18:48 )  Alb: 4.1 g/dL / Pro: 7.2 g/dL / ALK PHOS: 58 U/L / ALT: 37 U/L DA / AST: 16 U/L / GGT: x           CARDIAC MARKERS ( 18 Nov 2020 14:34 )  <0.015 ng/mL / x     / x     / x     / x      CARDIAC MARKERS ( 18 Nov 2020 06:30 )  <0.015 ng/mL / x     / x     / x     / x      CARDIAC MARKERS ( 17 Nov 2020 18:48 )  <0.015 ng/mL / x     / 71 U/L / x     / x        I&O's Summary        Culture - Blood (collected 18 Nov 2020 03:20)  Source: .Blood Blood  Preliminary Report (19 Nov 2020 04:01):    No growth to date.    Culture - Blood (collected 18 Nov 2020 03:18)  Source: .Blood Blood  Preliminary Report (19 Nov 2020 04:01):    No growth to date.                         PGY-1 Progress Note discussed with attending  PAGER #: [[phone]] TILL 5:00 PM  PLEASE CONTACT ON CALL TEAM:  - On Call Team (Please refer to Luis) FROM 5:00 PM - 8:30PM  - Nightfloat Team FROM 8:30 -7:30 AM    CHIEF COMPLAINT & BRIEF HOSPITAL COURSE:  39 yo male with past medical hx of GERD, came to ED c/o left sided chest pain associated to palpitations x 2 weeks. Also referred feeling dizzy for 2 weeks and left foot pain x 5 days. Pt denies any trauma or exacerbating factors. On admission, EKG showed Increased R/S ratio in V1, consider early transition or posterior infarct. Trop x 3 negative, no chest pain or palpitations, US doppler BL LE -ve for DVT, Echo showed normal ventricular function, EF 62%, CXR was unremarkable, high , Folate normal, vit B12 normal, A1c 4.8, uric acid 6.6 normal, Bcx prelim -ve, no leucocytosis.   Left foot pain could be secondary to cellulitis vs gout vs stress fracture. Patient was started on Cefazolin, Left foot x ray unremarkable, awaiting for MRI of left foot to r/o osteomyelitis. Remains with clean dressing as per podiatry.     INTERVAL HPI/OVERNIGHT EVENTS:   Patient refers he is feeling fine, denies any chest pain, palpitations, dizziness or sob. Patient referred pain on his left foot, on pain medication regimen.      MEDICATIONS  (STANDING):  aspirin enteric coated 81 milliGRAM(s) Oral daily  atorvastatin 40 milliGRAM(s) Oral at bedtime  ceFAZolin   IVPB 1000 milliGRAM(s) IV Intermittent every 8 hours  enoxaparin Injectable 40 milliGRAM(s) SubCutaneous daily  lidocaine   Patch 1 Patch Transdermal daily  meclizine 25 milliGRAM(s) Oral three times a day  pantoprazole    Tablet 40 milliGRAM(s) Oral before breakfast  sodium chloride 0.9%. 1000 milliLiter(s) (125 mL/Hr) IV Continuous <Continuous>    MEDICATIONS  (PRN):  acetaminophen   Tablet .. 650 milliGRAM(s) Oral every 6 hours PRN Mild Pain (1 - 3)  traMADol 25 milliGRAM(s) Oral every 8 hours PRN Moderate Pain (4 - 6)      Vital Signs Last 24 Hrs  T(C): 36.7 (19 Nov 2020 11:51), Max: 36.7 (18 Nov 2020 16:51)  T(F): 98 (19 Nov 2020 11:51), Max: 98.1 (18 Nov 2020 16:51)  HR: 54 (19 Nov 2020 11:51) (48 - 55)  BP: 105/60 (19 Nov 2020 11:51) (95/60 - 109/59)  BP(mean): --  RR: 18 (19 Nov 2020 11:51) (18 - 19)  SpO2: 99% (19 Nov 2020 11:51) (97% - 100%)    PHYSICAL EXAMINATION:  GENERAL: NAD, average  HEAD:  Atraumatic, Normocephalic  EYES:  conjunctiva and sclera clear  NECK: Supple, No JVD, Normal thyroid  CHEST/LUNG: Clear to auscultation. Clear to percussion bilaterally; No rales, rhonchi, wheezing, or rubs  HEART: Bradycardic; No murmurs, rubs, or gallops  ABDOMEN: Soft, Nontender, Nondistended; Bowel sounds present, no pain or masses on palpation   NERVOUS SYSTEM:  Alert & Oriented X3, calmed   EXTREMITIES:  2+ Peripheral Pulses, No clubbing, cyanosis, or edema. Left foot with clean dressing   SKIN: warm dry                          12.8   7.46  )-----------( 118      ( 19 Nov 2020 06:31 )             38.7     11-19    139  |  106  |  16  ----------------------------<  89  4.4   |  24  |  1.01    Ca    9.0      19 Nov 2020 06:31  Phos  3.6     11-18  Mg     2.4     11-18    TPro  7.2  /  Alb  4.1  /  TBili  0.7  /  DBili  x   /  AST  16  /  ALT  37  /  AlkPhos  58  11-17    LIVER FUNCTIONS - ( 17 Nov 2020 18:48 )  Alb: 4.1 g/dL / Pro: 7.2 g/dL / ALK PHOS: 58 U/L / ALT: 37 U/L DA / AST: 16 U/L / GGT: x           CARDIAC MARKERS ( 18 Nov 2020 14:34 )  <0.015 ng/mL / x     / x     / x     / x      CARDIAC MARKERS ( 18 Nov 2020 06:30 )  <0.015 ng/mL / x     / x     / x     / x      CARDIAC MARKERS ( 17 Nov 2020 18:48 )  <0.015 ng/mL / x     / 71 U/L / x     / x        I&O's Summary        Culture - Blood (collected 18 Nov 2020 03:20)  Source: .Blood Blood  Preliminary Report (19 Nov 2020 04:01):    No growth to date.    Culture - Blood (collected 18 Nov 2020 03:18)  Source: .Blood Blood  Preliminary Report (19 Nov 2020 04:01):    No growth to date.

## 2020-11-19 NOTE — PROGRESS NOTE ADULT - PROBLEM SELECTOR PLAN 2
Trop negative x 3   EKG shows Sinus Arturo  c/w ASA, statin  Outpatient Stress Test as pt refusing at this time.  Appreciate Cardiology input Trop negative x 3   EKG shows Sinus Arturo  C/w ASA, statin  Outpatient Stress Test as pt is refusing at this time  Cardiology consult appreciated

## 2020-11-19 NOTE — PROGRESS NOTE ADULT - PROBLEM SELECTOR PLAN 1
afebrile, no leukocytosis  c/w Cefazolin , Day 1-2  Pain regimen   PT Cellulitis vs gout vs stress fracture   Afebrile, no leukocytosis  C/w Cefazolin   Pain regimen  Podiatry on board  F/u MRI left foot to r/o osteomyelitis Cellulitis vs gout vs stress fracture vs bursitis  Afebrile, no leukocytosis  C/w Cefazolin for now  Pain regimen Tylenol, Ibuprofen, Tramadol  Podiatry on board  F/u MRI left foot to r/o osteomyelitis

## 2020-11-19 NOTE — PROGRESS NOTE ADULT - SUBJECTIVE AND OBJECTIVE BOX
Time of Visit:  Patient seen and examined.     MEDICATIONS  (STANDING):  aspirin enteric coated 81 milliGRAM(s) Oral daily  atorvastatin 40 milliGRAM(s) Oral at bedtime  ceFAZolin   IVPB 1000 milliGRAM(s) IV Intermittent every 8 hours  enoxaparin Injectable 40 milliGRAM(s) SubCutaneous daily  meclizine 25 milliGRAM(s) Oral three times a day  pantoprazole    Tablet 40 milliGRAM(s) Oral before breakfast  sodium chloride 0.9%. 1000 milliLiter(s) (125 mL/Hr) IV Continuous <Continuous>      MEDICATIONS  (PRN):  acetaminophen   Tablet .. 650 milliGRAM(s) Oral every 6 hours PRN Mild Pain (1 - 3)  ibuprofen  Tablet. 400 milliGRAM(s) Oral every 8 hours PRN Moderate Pain (4 - 6)  traMADol 25 milliGRAM(s) Oral every 12 hours PRN Severe Pain (7 - 10)       Medications up to date at time of exam.      PHYSICAL EXAMINATION:  Patient has no new complaints.  GENERAL: The patient is a well-developed, well-nourished, in no apparent distress.     Vital Signs Last 24 Hrs  T(C): 37.2 (19 Nov 2020 19:32), Max: 37.2 (19 Nov 2020 19:32)  T(F): 98.9 (19 Nov 2020 19:32), Max: 98.9 (19 Nov 2020 19:32)  HR: 56 (19 Nov 2020 19:32) (48 - 56)  BP: 115/60 (19 Nov 2020 19:32) (95/60 - 115/60)  BP(mean): --  RR: 19 (19 Nov 2020 19:32) (18 - 20)  SpO2: 98% (19 Nov 2020 19:32) (97% - 100%)   (if applicable)    Chest Tube (if applicable)    HEENT: Head is normocephalic and atraumatic. Extraocular muscles are intact. Mucous membranes are moist.     NECK: Supple, no palpable adenopathy.    LUNGS: Clear to auscultation, no wheezing, rales, or rhonchi.    HEART: Regular rate and rhythm without murmur.    ABDOMEN: Soft, nontender, and nondistended.  No hepatosplenomegaly is noted.    : No painful voiding, no pelvic pain    EXTREMITIES: Without any cyanosis, clubbing, rash, lesions or edema.    NEUROLOGIC: Awake, alert, oriented, grossly intact    SKIN: Warm, dry, good turgor.      LABS:                        12.8   7.46  )-----------( 118      ( 19 Nov 2020 06:31 )             38.7     11-19    139  |  106  |  16  ----------------------------<  89  4.4   |  24  |  1.01    Ca    9.0      19 Nov 2020 06:31  Phos  3.6     11-18  Mg     2.4     11-18            CARDIAC MARKERS ( 18 Nov 2020 14:34 )  <0.015 ng/mL / x     / x     / x     / x      CARDIAC MARKERS ( 18 Nov 2020 06:30 )  <0.015 ng/mL / x     / x     / x     / x                    MICROBIOLOGY: (if applicable)    RADIOLOGY & ADDITIONAL STUDIES:  EKG:   MRI:  < from: MR Foot No Cont, Left (11.19.20 @ 17:40) >    EXAM:  MR FOOT LT                            PROCEDURE DATE:  11/19/2020          INTERPRETATION:  MR FOOT LEFT    HISTORY: Left lateral foot pain. Evaluate for osteomyelitis.    TECHNIQUE:  Multiplanar MRI of the left forefoot was performed without contrast using 7 sequences.    COMPARISON:  Left foot x-rays dated November 17, 2020    FINDINGS:    OSSEOUS STRUCTURES    Acute Fractures/Stress Reactions:  None.    Chronic Fractures:  None.    Osteomyelitis: None.    INTERPHALANGEAL JOINTS    Articular Surfaces:  Preserved.    Effusions/Synovitis:  None.    1ST METATARSOPHALANGEAL JOINT    Articular Surfaces:  Preserved.    Effusions/Synovitis:  None.    Sesamoids:  Intact.    LESSER METATARSOPHALANGEAL JOINTS    Articular Surfaces:  Slight reactive edema or possibly early intraosseous ganglion formation is noted within the lateral margin of the third metatarsal head. Articular surfaces appear preserved.    Effusions/Synovitis:  None.    TARSOMETATARSAL JOINTS    Articular Surfaces:  Preserved.    Effusions/Synovitis:  None.    INTERTARSAL JOINTS    Articular Surfaces:  Preserved.    Effusions/Synovitis:  None.    INTERMETATARSAL SPACES    Neuromas:  None.    Bursa:  Slight third webspace bursa is noted.    LISFRANC LIGAMENT  Intact.    TENDONS    Flexor Tendons:  Intact.    Extensor Tendons:  Intact.    DISTAL PLANTAR FASCIA  Intact.    SOFT TISSUES    Musculature:  Maintained.    Subcutaneous Tissues:  Mild dorsal subcutaneous edema is noted. No abscess is seen.    IMPRESSION:  1. Mild dorsal subcutaneous edema without appreciated abscess or osteomyelitis.  2. Slight third webspace bursa.  3. Focal presumed subchondral reactive edema versus early intraosseous ganglion formation of the third metatarsal head.  4. Consider follow-up MRI to assess stability as warranted.            PRATIMA CHAWLA MD; Attending Radiologist  This document has been electronically signed. Nov 19 2020  7:50PM    < end of copied text >  ECHO:   < from: US Duplex Venous Lower Ext Complete, Bilateral (11.18.20 @ 16:18) >    EXAM:  US DPLX LWR EXT VEINS COMPL BI                            PROCEDURE DATE:  11/18/2020          INTERPRETATION:  CLINICAL INFORMATION: Acute ischemic heart disease, evaluate DVT, left foot pain x3 days    COMPARISON: None available.    TECHNIQUE: Duplex sonography of the BILATERAL LOWER extremity veins with color and spectral Doppler, with and without compression.    FINDINGS:    There is normal compressibility of the bilateral common femoral, femoral and popliteal veins.  Doppler examination shows normal spontaneous and phasic flow.    No calf vein thrombosis is detected.    IMPRESSION:  No evidence of deep venous thrombosis in either lower extremity.                BECKY MEREDITH MD; Attending Radiologist  This document has been electronically signed. Nov 18 2020  4:20PM    < end of copied text >      2DECHO:   < from: Transthoracic Echocardiogram (11.19.20 @ 10:11) >    Patient name: CASSY BROWER  YOB: 1980   Age: 40 (M)   MR#: [id number]  Study Date: 11/19/2020  Location: [address] [place], [place] [address]Sonographer: Jayesh Cortez KELLY  Study quality: Fair  Referring Physician: Russel Leggett MD  Blood Pressure: 99/55 mmHg  Height: 182 cm  Weight: 86 kg  BSA: 2.1 m2  ------------------------------------------------------------------------    PROCEDURE: Transthoracic echocardiogram with 2-D, M-Mode  and complete spectral and color flow Doppler.  INDICATION: Chest pain, unspecified (R07.9)  HISTORY:  ------------------------------------------------------------------------  DIMENSIONS:  Dimensions:     Normal Values:  LA:     3.3 cm    2.0 - 4.0 cm  Ao:     3.1 cm    2.0 - 3.8 cm  SEPTUM: 0.8 cm    0.6 - 1.2 cm  PWT: 0.7 cm    0.6 - 1.1 cm  LVIDd:  4.0 cm    3.0 - 5.6 cm  LVIDs:  2.7 cm    1.8 - 4.0 cm      Derived Variables:  LVMI: 41 g/m2  RWT: 0.35  Ejection Fraction Dhaliwal: 62 %    ------------------------------------------------------------------------  OBSERVATIONS:  Mitral Valve: Normal mitral valve. Trace mitral  regurgitation.  Aortic Root: Normal aortic root.  Aortic Valve: Normal trileaflet aortic valve. No aortic  stenosis. No aortic valve regurgitation seen.  Left Atrium: Normal left atrium.  LA volume index = 30  cc/m2.  Left Ventricle: Normal Left Ventricular Systolic Function,  (EF = 62% by biplane) No regional wall motion  abnormalities. Normal left ventricular internal dimensions  and wall thicknesses. Normal diastolic function.  RightHeart: Normal right atrium. Normal right ventricular  size and systolic function (TAPSE 2.2 cm). Normal tricuspid  valve. Trace tricuspid regurgitation. Pulmonic valve not  well seen. Mild pulmonic insufficiency is noted.  Pericardium/PleuraNo pericardial effusion.  Hemodynamic: Insufficient tricuspid regurgitation jet to  allow calculation of RVSP.  ------------------------------------------------------------------------  CONCLUSIONS:  1. Normal mitral valve. Trace mitral regurgitation.  2. Normal trileaflet aortic valve. No aortic stenosis. No  aortic valve regurgitation seen.  3. Normal aortic root.  4. Normal left atrium.  5. Normal left ventricular internal dimensions and wall  thicknesses.  6. Normal Left Ventricular Systolic Function,(EF = 62% by  biplane)  7. Normal diastolic function.  8. Normal right atrium.  9. Normal right ventricular size and systolic function  (TAPSE 2.2 cm).  10. Normal tricuspid valve. Trace tricuspid regurgitation.  11. Pulmonic valve not well seen. Mild pulmonic  insufficiency is noted.  12. No pericardial effusion.    *** No previous Echo exam.  ------------------------------------------------------------------------  Confirmed on  11/19/2020 - 16:28:09 by Perez Conway MD  ------------------------------------------------------------------------    < end of copied text >  IMPRESSION: 40y Male PAST MEDICAL & SURGICAL HISTORY:  GERD (gastroesophageal reflux disease)    S/P appendectomy     p/w         IMP:  This is 40 yr old man  with  GERD pw multiple complaints and left sided chest pain associated with chest palpitations x2 weeks. Patient states its associated with radiation to left arm. Pt endorses feeling dizzy for 2 weeks, positional vertigo when turns head. Pt also states main reason for coming to hospital was left foot pain which started 5 days ago. Pt denies any trauma or exacerbating factors. pain is sharp and not improved with anything.  Pt has no n/v, fever, no recent cold, ear infection. Pat stated his chest pain/ palpitation with SOB occurred after smoking marijuana . SOB is due to palpitation       Sugg:  -advise to stop marijuana use  -MRI of left foot noted   -ACS work up  -ASA. Statin  -podiatry eval noted  -doppler of b/l lower ext neg for dvt   -cards eval noted .. pat refusing chemical stress , cannot walk due to left foot pain

## 2020-11-20 ENCOUNTER — TRANSCRIPTION ENCOUNTER (OUTPATIENT)
Age: 40
End: 2020-11-20

## 2020-11-20 VITALS
OXYGEN SATURATION: 99 % | HEART RATE: 56 BPM | TEMPERATURE: 97 F | DIASTOLIC BLOOD PRESSURE: 64 MMHG | RESPIRATION RATE: 18 BRPM | SYSTOLIC BLOOD PRESSURE: 110 MMHG

## 2020-11-20 LAB
ALBUMIN SERPL ELPH-MCNC: 3.2 G/DL — LOW (ref 3.5–5)
ALP SERPL-CCNC: 51 U/L — SIGNIFICANT CHANGE UP (ref 40–120)
ALT FLD-CCNC: 29 U/L DA — SIGNIFICANT CHANGE UP (ref 10–60)
ANION GAP SERPL CALC-SCNC: 6 MMOL/L — SIGNIFICANT CHANGE UP (ref 5–17)
AST SERPL-CCNC: 15 U/L — SIGNIFICANT CHANGE UP (ref 10–40)
BILIRUB SERPL-MCNC: 0.2 MG/DL — SIGNIFICANT CHANGE UP (ref 0.2–1.2)
BUN SERPL-MCNC: 10 MG/DL — SIGNIFICANT CHANGE UP (ref 7–18)
CALCIUM SERPL-MCNC: 8.8 MG/DL — SIGNIFICANT CHANGE UP (ref 8.4–10.5)
CHLORIDE SERPL-SCNC: 106 MMOL/L — SIGNIFICANT CHANGE UP (ref 96–108)
CO2 SERPL-SCNC: 30 MMOL/L — SIGNIFICANT CHANGE UP (ref 22–31)
CREAT SERPL-MCNC: 0.89 MG/DL — SIGNIFICANT CHANGE UP (ref 0.5–1.3)
GLUCOSE SERPL-MCNC: 80 MG/DL — SIGNIFICANT CHANGE UP (ref 70–99)
HCT VFR BLD CALC: 40 % — SIGNIFICANT CHANGE UP (ref 39–50)
HGB BLD-MCNC: 13.5 G/DL — SIGNIFICANT CHANGE UP (ref 13–17)
MAGNESIUM SERPL-MCNC: 2.3 MG/DL — SIGNIFICANT CHANGE UP (ref 1.6–2.6)
MCHC RBC-ENTMCNC: 30.8 PG — SIGNIFICANT CHANGE UP (ref 27–34)
MCHC RBC-ENTMCNC: 33.8 GM/DL — SIGNIFICANT CHANGE UP (ref 32–36)
MCV RBC AUTO: 91.1 FL — SIGNIFICANT CHANGE UP (ref 80–100)
NRBC # BLD: 0 /100 WBCS — SIGNIFICANT CHANGE UP (ref 0–0)
PHOSPHATE SERPL-MCNC: 4.4 MG/DL — SIGNIFICANT CHANGE UP (ref 2.5–4.5)
PLATELET # BLD AUTO: 127 K/UL — LOW (ref 150–400)
POTASSIUM SERPL-MCNC: 3.8 MMOL/L — SIGNIFICANT CHANGE UP (ref 3.5–5.3)
POTASSIUM SERPL-SCNC: 3.8 MMOL/L — SIGNIFICANT CHANGE UP (ref 3.5–5.3)
PROT SERPL-MCNC: 6.4 G/DL — SIGNIFICANT CHANGE UP (ref 6–8.3)
RBC # BLD: 4.39 M/UL — SIGNIFICANT CHANGE UP (ref 4.2–5.8)
RBC # FLD: 11.6 % — SIGNIFICANT CHANGE UP (ref 10.3–14.5)
SODIUM SERPL-SCNC: 142 MMOL/L — SIGNIFICANT CHANGE UP (ref 135–145)
WBC # BLD: 8.11 K/UL — SIGNIFICANT CHANGE UP (ref 3.8–10.5)
WBC # FLD AUTO: 8.11 K/UL — SIGNIFICANT CHANGE UP (ref 3.8–10.5)

## 2020-11-20 PROCEDURE — 80053 COMPREHEN METABOLIC PANEL: CPT

## 2020-11-20 PROCEDURE — 87635 SARS-COV-2 COVID-19 AMP PRB: CPT

## 2020-11-20 PROCEDURE — 84480 ASSAY TRIIODOTHYRONINE (T3): CPT

## 2020-11-20 PROCEDURE — 82550 ASSAY OF CK (CPK): CPT

## 2020-11-20 PROCEDURE — 96375 TX/PRO/DX INJ NEW DRUG ADDON: CPT

## 2020-11-20 PROCEDURE — 84484 ASSAY OF TROPONIN QUANT: CPT

## 2020-11-20 PROCEDURE — 85025 COMPLETE CBC W/AUTO DIFF WBC: CPT

## 2020-11-20 PROCEDURE — 82746 ASSAY OF FOLIC ACID SERUM: CPT

## 2020-11-20 PROCEDURE — 83690 ASSAY OF LIPASE: CPT

## 2020-11-20 PROCEDURE — 36415 COLL VENOUS BLD VENIPUNCTURE: CPT

## 2020-11-20 PROCEDURE — 80048 BASIC METABOLIC PNL TOTAL CA: CPT

## 2020-11-20 PROCEDURE — 80061 LIPID PANEL: CPT

## 2020-11-20 PROCEDURE — 85027 COMPLETE CBC AUTOMATED: CPT

## 2020-11-20 PROCEDURE — 93005 ELECTROCARDIOGRAM TRACING: CPT

## 2020-11-20 PROCEDURE — 84436 ASSAY OF TOTAL THYROXINE: CPT

## 2020-11-20 PROCEDURE — 93970 EXTREMITY STUDY: CPT

## 2020-11-20 PROCEDURE — 86769 SARS-COV-2 COVID-19 ANTIBODY: CPT

## 2020-11-20 PROCEDURE — 83036 HEMOGLOBIN GLYCOSYLATED A1C: CPT

## 2020-11-20 PROCEDURE — 84443 ASSAY THYROID STIM HORMONE: CPT

## 2020-11-20 PROCEDURE — 82009 KETONE BODYS QUAL: CPT

## 2020-11-20 PROCEDURE — 73630 X-RAY EXAM OF FOOT: CPT

## 2020-11-20 PROCEDURE — 84100 ASSAY OF PHOSPHORUS: CPT

## 2020-11-20 PROCEDURE — 87040 BLOOD CULTURE FOR BACTERIA: CPT

## 2020-11-20 PROCEDURE — 73718 MRI LOWER EXTREMITY W/O DYE: CPT

## 2020-11-20 PROCEDURE — 71046 X-RAY EXAM CHEST 2 VIEWS: CPT

## 2020-11-20 PROCEDURE — 82607 VITAMIN B-12: CPT

## 2020-11-20 PROCEDURE — 84550 ASSAY OF BLOOD/URIC ACID: CPT

## 2020-11-20 PROCEDURE — 96374 THER/PROPH/DIAG INJ IV PUSH: CPT

## 2020-11-20 PROCEDURE — 85652 RBC SED RATE AUTOMATED: CPT

## 2020-11-20 PROCEDURE — 83735 ASSAY OF MAGNESIUM: CPT

## 2020-11-20 PROCEDURE — 93306 TTE W/DOPPLER COMPLETE: CPT

## 2020-11-20 PROCEDURE — 99285 EMERGENCY DEPT VISIT HI MDM: CPT

## 2020-11-20 PROCEDURE — 83605 ASSAY OF LACTIC ACID: CPT

## 2020-11-20 RX ORDER — ASPIRIN/CALCIUM CARB/MAGNESIUM 324 MG
1 TABLET ORAL
Qty: 30 | Refills: 0
Start: 2020-11-20 | End: 2020-12-19

## 2020-11-20 RX ORDER — ATORVASTATIN CALCIUM 80 MG/1
1 TABLET, FILM COATED ORAL
Qty: 30 | Refills: 0
Start: 2020-11-20 | End: 2020-12-19

## 2020-11-20 RX ORDER — IBUPROFEN 200 MG
1 TABLET ORAL
Qty: 15 | Refills: 0
Start: 2020-11-20 | End: 2020-11-24

## 2020-11-20 RX ORDER — CEFUROXIME AXETIL 250 MG
1 TABLET ORAL
Qty: 4 | Refills: 0
Start: 2020-11-20 | End: 2020-11-21

## 2020-11-20 RX ORDER — MECLIZINE HCL 12.5 MG
1 TABLET ORAL
Qty: 90 | Refills: 0
Start: 2020-11-20 | End: 2020-12-19

## 2020-11-20 RX ORDER — OMEPRAZOLE 10 MG/1
1 CAPSULE, DELAYED RELEASE ORAL
Qty: 0 | Refills: 0 | DISCHARGE

## 2020-11-20 RX ORDER — OMEPRAZOLE 10 MG/1
1 CAPSULE, DELAYED RELEASE ORAL
Qty: 30 | Refills: 0
Start: 2020-11-20 | End: 2020-12-19

## 2020-11-20 RX ADMIN — Medication 25 MILLIGRAM(S): at 06:03

## 2020-11-20 RX ADMIN — Medication 100 MILLIGRAM(S): at 06:03

## 2020-11-20 RX ADMIN — PANTOPRAZOLE SODIUM 40 MILLIGRAM(S): 20 TABLET, DELAYED RELEASE ORAL at 06:03

## 2020-11-20 RX ADMIN — Medication 100 MILLIGRAM(S): at 15:28

## 2020-11-20 RX ADMIN — Medication 81 MILLIGRAM(S): at 12:21

## 2020-11-20 RX ADMIN — Medication 25 MILLIGRAM(S): at 15:28

## 2020-11-20 NOTE — DISCHARGE NOTE NURSING/CASE MANAGEMENT/SOCIAL WORK - NSDCPEWEB_GEN_ALL_CORE
[facility] for Tobacco Control website --- [email or web address] website --- [email or web address]

## 2020-11-20 NOTE — DISCHARGE NOTE PROVIDER - NSDCMRMEDTOKEN_GEN_ALL_CORE_FT
omeprazole 40 mg oral delayed release capsule: 1 cap(s) orally once a day   aspirin 81 mg oral delayed release tablet: 1 tab(s) orally once a day  atorvastatin 40 mg oral tablet: 1 tab(s) orally once a day (at bedtime)  cefuroxime 250 mg oral tablet: 1 tab(s) orally 2 times a day   ibuprofen 400 mg oral tablet: 1 tab(s) orally every 8 hours, As needed, Moderate Pain (4 - 6)  meclizine 25 mg oral tablet: 1 tab(s) orally 3 times a day  omeprazole 40 mg oral delayed release capsule: 1 cap(s) orally once a day

## 2020-11-20 NOTE — DISCHARGE NOTE PROVIDER - HOSPITAL COURSE
41 yo male with past medical hx of GERD, came to ED c/o left sided chest pain associated to palpitations x 2 weeks. Also referred feeling dizzy for 2 weeks and left foot pain x 5 days. Pt denies any trauma or exacerbating factors. On admission, EKG showed Increased R/S ratio in V1, consider early transition or posterior infarct. Patient was admitted to telemetry for close monitoring, on tele bradycardia, asymptomatic, Trop x 3 negative, CXR was unremarkable, no chest pain or palpitations, US doppler BL LE -ve for DVT, Echo showed normal ventricular function, EF 62%, high  (started on Statin), Folate normal, vit B12 normal, A1c 4.8, uric acid 6.6 normal, Bcx -ve, no leucocytosis. Patient was seen by cardiologist, recommended stress test but patient refused  He also c/o left foot pain, could be secondary to cellulitis vs gout vs stress fracture vs bursitis. Patient was seen by podiatry, X ray left foot unremarkable, MRI left foot showed Slight third webspace bursa. Focal presumed subchondral reactive edema versus early intraosseous ganglion formation of the third metatarsal head. Patient was treated with Cefazolin IV x 3 days and upon discharge will take oral antibiotic to complete 5 days of treatment.   Podiatry recommends outpatient follow up, weight bearing as tolerated, wear post op boot. Remains with clean dressing as per podiatry. 41 yo male with past medical hx of GERD, came to ED c/o left sided chest pain associated to palpitations x 2 weeks. Also referred feeling dizzy for 2 weeks and left foot pain x 5 days. Pt denies any trauma or exacerbating factors. On admission, EKG showed Increased R/S ratio in V1, consider early transition or posterior infarct. Patient was admitted to telemetry for close monitoring, on tele bradycardia, asymptomatic, Trop x 3 negative, CXR was unremarkable, no chest pain or palpitations, US doppler BL LE -ve for DVT, Echo showed normal ventricular function, EF 62%, high  (started on Statin), Folate normal, vit B12 normal, A1c 4.8, uric acid 6.6 normal, Bcx -ve, no leucocytosis. Patient was seen by cardiologist, recommended stress test but patient refused and stated he will follow up outpatient with his cardiologist.   He also c/o left foot pain, could be secondary to cellulitis vs gout vs stress fracture vs bursitis. Patient was seen by podiatry, X ray left foot unremarkable, MRI left foot showed Slight third webspace bursa. Focal presumed subchondral reactive edema versus early intraosseous ganglion formation of the third metatarsal head. Patient was treated with Cefazolin IV x 3 days and upon discharge will take oral antibiotic to complete 5 days of treatment.   Podiatry recommends outpatient follow up, weight bearing as tolerated and use of cane.

## 2020-11-20 NOTE — SBIRT NOTE ADULT - NSSBIRTALCPOSREINDET_GEN_A_CORE
Reinforcement provided, pt reports no issues with alcohol consumption. Pt states he did not endorse having more than 6 drinks on one occasion.

## 2020-11-20 NOTE — DISCHARGE NOTE PROVIDER - CARE PROVIDER_API CALL
Melissa Martinez  PODIATRIC MEDICINE  [address] [place] [address]  Phone: [phone]  Fax: [phone]  Follow Up Time:

## 2020-11-20 NOTE — DISCHARGE NOTE PROVIDER - NSDCCPCAREPLAN_GEN_ALL_CORE_FT
PRINCIPAL DISCHARGE DIAGNOSIS  Diagnosis: Chest pain  Assessment and Plan of Treatment: You came to the hospital complaining of chest pain and palpitations. You were admitted to telemetry floor. You were found to have slow heart rate, no symptoms, cardiac enzymes negative x 3, CXR was unremarkable, US doppler bilateral of lower extremities was negative for thrombus, Echocardiogram showed normal ventricular function, EF 62%. You were also found to have high , started on Statin.   You were seen by cardiologist, recommended stress test but you refused and stated you will follow up outpatient with your PCP and Cardiologist.   Please take medications as prescribed.         SECONDARY DISCHARGE DIAGNOSES  Diagnosis: Left foot pain  Assessment and Plan of Treatment: You came complaining of Left foot pain. X ray of left foot was unremarkable, MRI left foot showed Slight third webspace bursa. Focal presumed subchondral reactive edema versus early intraosseous ganglion formation of the third metatarsal head. You were seen by podiatry, treated with Cefazolin IV x 3 days and upon discharge you will continue to take oral Ceftin to complete 5 days of treatment and Ibuoprofen for pain control. .   Podiatry recommends outpatient follow up, weight bearing as tolerated and use of cane. You are being sent on oral Ibuprofen for 5 days.   Please follow up with podiatry.    Diagnosis: Vertigo  Assessment and Plan of Treatment: You came complaining of    Diagnosis: GERD (gastroesophageal reflux disease)  Assessment and Plan of Treatment: GERD (gastroesophageal reflux disease)     PRINCIPAL DISCHARGE DIAGNOSIS  Diagnosis: Chest pain  Assessment and Plan of Treatment: You came to the hospital complaining of chest pain and palpitations. You were admitted to telemetry floor. You were found to have slow heart rate, no symptoms, cardiac enzymes negative x 3, CXR was unremarkable, US doppler bilateral of lower extremities was negative for thrombus, Echocardiogram showed normal ventricular function, EF 62%. You were also found to have high , started on Statin.   You were seen by cardiologist, recommended stress test but you refused and stated you will follow up outpatient with your PCP and Cardiologist.   Please take medications as prescribed.         SECONDARY DISCHARGE DIAGNOSES  Diagnosis: Left foot pain  Assessment and Plan of Treatment: You came complaining of Left foot pain. X ray of left foot was unremarkable, MRI left foot showed Slight third webspace bursa. Focal presumed subchondral reactive edema versus early intraosseous ganglion formation of the third metatarsal head. You were seen by podiatry, treated with Cefazolin IV x 3 days and upon discharge you will continue to take oral Ceftin for 2 days to complete 5 days of treatment and Ibuoprofen for pain control.   Podiatry recommends outpatient follow up, weight bearing as tolerated and use of cane. You are being sent on oral Ibuprofen for 5 days.   Please follow up with podiatry.    Diagnosis: Vertigo  Assessment and Plan of Treatment: You came complaining of dizziness. On your physical exam no neurologic deficiencies, orthostatics negative, blood pressure was low on admission and improved with fluids. You were started on Meclizine.   Please follow up with your PCP.    Diagnosis: GERD (gastroesophageal reflux disease)  Assessment and Plan of Treatment: You have history of GERD (gastroesophageal reflux disease).   Please continue to take your home medication.   Please follow up with your PCP.     PRINCIPAL DISCHARGE DIAGNOSIS  Diagnosis: Chest pain  Assessment and Plan of Treatment: You came to the hospital complaining of chest pain and palpitations. You were admitted to telemetry floor. You were found to have slow heart rate, no symptoms, cardiac enzymes negative x 3, CXR was unremarkable, US doppler bilateral of lower extremities was negative for thrombus, Echocardiogram showed normal ventricular function, EF 62%. You were also found to have high , started on Statin.   You were seen by cardiologist, recommended stress test but you refused and stated you will follow up outpatient with your PCP and Cardiologist.   Please take medications as prescribed.         SECONDARY DISCHARGE DIAGNOSES  Diagnosis: Left foot pain  Assessment and Plan of Treatment: You came complaining of Left foot pain. X ray of left foot was unremarkable, MRI left foot showed Slight third webspace bursa. Focal presumed subchondral reactive edema versus early intraosseous ganglion formation of the third metatarsal head. You were seen by podiatry, treated with Cefazolin IV x 3 days and upon discharge you will continue to take oral Ceftin for 2 days to complete 5 days of treatment and Ibuoprofen for pain control.   Podiatry recommends outpatient follow up, weight bearing as tolerated and use of cane. You are being sent on oral Ibuprofen for 5 days.   Please follow up with podiatry.    Diagnosis: Hyperlipidemia  Assessment and Plan of Treatment: You were diagnosed with Hyperlipidemia. On this admission you were found to have Triglycerides 264. You were started on Statin.   Please take medication as prescribed. Maintain healthy lifestyle, low fat diet, exercise regularly and check your lipid levels routinely.   Please follow up with your PCP and cardiologist.    Diagnosis: Vertigo  Assessment and Plan of Treatment: You came complaining of dizziness. On your physical exam no neurologic deficiencies, orthostatics negative, blood pressure was low on admission and improved with fluids. You were started on Meclizine.   Please follow up with your PCP.    Diagnosis: GERD (gastroesophageal reflux disease)  Assessment and Plan of Treatment: You have history of GERD (gastroesophageal reflux disease).   Please continue to take your home medication.   Please follow up with your PCP.

## 2020-11-20 NOTE — DISCHARGE NOTE NURSING/CASE MANAGEMENT/SOCIAL WORK - NSDCPEEMAIL_GEN_ALL_CORE
[facility] for Tobacco Control email [email or web address]

## 2020-11-23 LAB
CULTURE RESULTS: SIGNIFICANT CHANGE UP
CULTURE RESULTS: SIGNIFICANT CHANGE UP
SPECIMEN SOURCE: SIGNIFICANT CHANGE UP
SPECIMEN SOURCE: SIGNIFICANT CHANGE UP

## 2023-02-04 NOTE — ED ADULT TRIAGE NOTE - ARRIVAL FROM
Tesfaye is a 64 year old male who presents [facility] for  back pain.       Right sided lower paravertebral muscle pain        .   It does not  radiate to his legs and not associated with weakness or paresthesias of the extremities.  There were no bladder or bowel changes noted.  The pain is aggravated by movement and lifting, and is minimally helped with otc meds.      Chest Pain: no  SOB:  no  Urinary Symptoms:  no  History of Cancer:  no  Trauma:  no  Weight Loss:  No      no prior back problems  Prior Surgery on Back:  No  Patient Active Problem List   Diagnosis   • Acute, but ill-defined, cerebrovascular disease   • Fitting and adjustment of spectacles and contact lenses   • Hypertriglyceridemia   • DM2 (diabetes mellitus, type 2) (CMS/[facility])   • CVA (cerebral vascular accident) (CMS/[facility])   • CAD (coronary artery disease) of bypass graft   • HTN (hypertension), benign   • PCO (posterior capsular opacification), right   • Glaucoma suspect, bilateral   • Type 2 diabetes mellitus with polyneuropathy (CMS/[facility])     Past Medical History:   Diagnosis Date   • CAD (coronary artery disease) of bypass graft 2010    s/p CABG x 5   • CVA (cerebral vascular accident) (CMS/HCC) 2007    went to [place] due to vertebral artery?   • DM2 (diabetes mellitus, type 2) (CMS/[facility])    • HTN (hypertension), benign    • Hypertriglyceridemia    '          All:  ALLERGIES:  Patient has no known allergies.  Current Outpatient Medications   Medication Sig Dispense Refill   • methylPREDNISolone (MEDROL DOSEPAK) 4 MG tablet follow package directions 21 tablet 0   • cyclobenzaprine (FLEXERIL) 10 MG tablet Take 1 tablet by mouth 3 times daily as needed for Muscle spasms. 15 tablet 0   • clopidogrel (PLAVIX) 75 MG tablet TAKE 1 TABLET BY MOUTH DAILY 90 tablet 0   • metformin (GLUCOPHAGE) 1000 MG tablet TAKE 1 TABLET BY MOUTH TWICE DAILY WITH MEALS 180 tablet 1   • glyBURIDE (DIABETA) 5 MG tablet Take 1 tablet by mouth in the morning and 1  tablet in the evening. Take with meals. 180 tablet 1   • Januvia 50 MG tablet TAKE 1 TABLET BY MOUTH DAILY 90 tablet 1   • zoster vaccine recomb adjuvanted (SHINGRIX) 50 MCG/0.5ML injection Inject 0.5 mLs into the muscle 1 time. Repeat dose in 2 to 6 months (unless 1 dose already given), for a total of 2 doses. 1 each 1   • carvedilol (COREG CR) 80 MG 24 hr capsule TAKE 1 CAPSULE BY MOUTH DAILY 90 capsule 1   • valsartan (DIOVAN) 160 MG tablet TAKE 2 TABLETS BY MOUTH DAILY 180 tablet 3   • NIFEdipine CC (ADALAT CC) 60 MG 24 hr tablet TAKE 1 TABLET BY MOUTH DAILY 90 tablet 3   • fenofibrate 160 MG tablet TAKE 1 TABLET BY MOUTH DAILY 90 tablet 3   • rosuvastatin (CRESTOR) 40 MG tablet TAKE 1 TABLET BY MOUTH DAILY 90 tablet 3   • tadalafil (CIALIS) 20 MG tablet Take 1 tablet by mouth as needed for Erectile Dysfunction. 30 tablet 1   • glucose monitoring kit (FREESTYLE) monitoring kit Use to test blood sugar 2 times daily // Machine: Freestyle // Dx: E11.9 DM Type 2 without current long term insulin use 1 each 0   • blood glucose test strip Use to test blood sugar 2 times daily // Machine: Freestyle // Dx: E11.9 DM Type 2 without current long term insulin use 200 each 3   • Lancets (freestyle) Misc Use to test blood sugar 2 times daily // Machine: Freestyle // Dx: E11.9 DM Type 2 without current long term insulin use 200 each 3   • aspirin 81 MG tablet Take 1 tablet by mouth daily.       No current facility-administered medications for this visit.       OBJECTIVE  Visit Vitals  BP (!) 142/70   Pulse 68   Temp 98 °F (36.7 °C) (Temporal)   Resp 16   SpO2 100%      General appearance:nontoxic in appearance, no acute distress  Lungs:  clear to auscultation, good aeration  CV:  regular rate and rhythm, no murmurs      Back exam: Normal symmetry and alignment    No kyphosis or scoliosis.    No erythema, warmth, or midline pain    No deformity    No mass noted   positive right sided paravertebral tenderness and muscle spasm               At the lumbar level   Reduced range of moion   Normal spinal felexion, able to squat      Able to Heel walk      Able to tiptoe walk     Straight-leg Raise: negative      DTR's:  Present and equal  Strength:  5/5 bilateral     ASSESSMENT:/PLAN:    Acute musculoskeletal back strain with muscle spasm and without  features suggestive of acute sciatica or neurologic compromise  Discussed this with the patient.   Rest, heat and prescription meds as ordered      The patient indicates understanding of these issues and agrees with the plan.   Side effects of all medications were discussed.  Patient is instructed to follow up in 2-3 days or as needed.  Patient is to go to the emergency room for any significant change or worsening.  Patient was discharged in a stable condition and was in agreement with the plan.  No further questions.       Home

## 2023-05-03 NOTE — DISCHARGE NOTE NURSING/CASE MANAGEMENT/SOCIAL WORK - NURSING SECTION COMPLETE
From: Gagan Taylro  To: Tiffnaie Sanchez  Sent: 5/3/2023 3:46 PM CDT  Subject: Ear infection     Hello, I was seen at urgent care & diagnosed with a ear infection & sinus. The 2nd visit I was still having ear pain & blowing blood out noise, but this time they could see it in ear. The 1st time they said was virtual & to wait out. But the pain got to bad, not today I’m back to having the beginning pains again. I was given a 7 day tx & not a 14, could that be the reason it’s didn’t go away, do I need to go back to urgent care to get me does?   Patient/Caregiver provided printed discharge information.

## 2023-10-07 NOTE — PATIENT PROFILE ADULT - NSPROHMSYMPCOND_GEN_A_NUR
Vital Signs Last 24 Hrs  T(C): 36.6 (10-06-23 @ 22:39), Max: 36.8 (10-06-23 @ 17:00)  T(F): 97.9 (10-06-23 @ 22:39), Max: 98.2 (10-06-23 @ 17:00)  HR: 57 (10-06-23 @ 22:39) (56 - 73)  BP: 177/87 (10-06-23 @ 22:39) (123/77 - 177/87)  BP(mean): 105 (10-06-23 @ 20:39) (95 - 105)  RR: 18 (10-06-23 @ 22:39) (18 - 18)  SpO2: 98% (10-06-23 @ 22:39) (98% - 100%)     none
